# Patient Record
Sex: MALE | Race: WHITE | NOT HISPANIC OR LATINO | Employment: OTHER | URBAN - METROPOLITAN AREA
[De-identification: names, ages, dates, MRNs, and addresses within clinical notes are randomized per-mention and may not be internally consistent; named-entity substitution may affect disease eponyms.]

---

## 2019-04-03 ENCOUNTER — TELEPHONE (OUTPATIENT)
Dept: CARDIOLOGY | Facility: CLINIC | Age: 54
End: 2019-04-03

## 2019-04-03 NOTE — TELEPHONE ENCOUNTER
NPV needed  Pts insurance will be [employer], does not go active til 4/15.  Requesting [facility] loc.  First avail 5/16, pt requested sooner   P: [phone]

## 2019-04-25 ENCOUNTER — OFFICE VISIT (OUTPATIENT)
Dept: CARDIOLOGY | Facility: CLINIC | Age: 54
End: 2019-04-25
Payer: COMMERCIAL

## 2019-04-25 ENCOUNTER — TELEPHONE (OUTPATIENT)
Dept: CARDIOLOGY | Facility: CLINIC | Age: 54
End: 2019-04-25

## 2019-04-25 VITALS
SYSTOLIC BLOOD PRESSURE: 120 MMHG | WEIGHT: 315 LBS | RESPIRATION RATE: 18 BRPM | BODY MASS INDEX: 45.1 KG/M2 | HEIGHT: 70 IN | DIASTOLIC BLOOD PRESSURE: 80 MMHG | TEMPERATURE: 98.6 F | HEART RATE: 86 BPM

## 2019-04-25 DIAGNOSIS — R06.09 DOE (DYSPNEA ON EXERTION): Primary | ICD-10-CM

## 2019-04-25 PROBLEM — J44.9 CHRONIC OBSTRUCTIVE PULMONARY DISEASE (CMS/HCC): Status: ACTIVE | Noted: 2019-04-25

## 2019-04-25 PROCEDURE — 99204 OFFICE O/P NEW MOD 45 MIN: CPT | Performed by: INTERNAL MEDICINE

## 2019-04-25 PROCEDURE — 93000 ELECTROCARDIOGRAM COMPLETE: CPT | Performed by: INTERNAL MEDICINE

## 2019-04-25 RX ORDER — VARENICLINE TARTRATE 0.5 (11)-1
KIT ORAL SEE ADMIN INSTRUCTIONS
Refills: 0 | COMMUNITY
Start: 2019-04-15 | End: 2019-08-15 | Stop reason: ALTCHOICE

## 2019-04-25 RX ORDER — HYDROCHLOROTHIAZIDE 25 MG/1
25 TABLET ORAL
Refills: 3 | COMMUNITY
Start: 2019-04-15 | End: 2019-09-18 | Stop reason: SDUPTHER

## 2019-04-25 RX ORDER — ALBUTEROL SULFATE 90 UG/1
INHALANT RESPIRATORY (INHALATION)
Refills: 0 | COMMUNITY
Start: 2019-03-15

## 2019-04-25 ASSESSMENT — ENCOUNTER SYMPTOMS
DYSPNEA ON EXERTION: 1
WEIGHT LOSS: 1
ENDOCRINE NEGATIVE: 1
NEUROLOGICAL NEGATIVE: 1
HEARTBURN: 1
ALLERGIC/IMMUNOLOGIC NEGATIVE: 1
SHORTNESS OF BREATH: 1
BLURRED VISION: 1
PSYCHIATRIC NEGATIVE: 1

## 2019-04-25 NOTE — PROGRESS NOTES
Chief Complaint: I saw Rinku is a new patient today.  He is here to be evaluated for progressive shortness of breath with exertion.  He does have a history of COPD and a long-term cigarette smoking history but he has now only developed shortness of breath but severe lower extremity edema that seems to be responsive to diuretic therapy.  He denies chest discomfort but has shortness of breath with exertion, not anxiety, cold weather, postprandially, certainly after sex he has severe shortness of breath not at rest or nocturnally.  He cannot climb a flight of stairs without getting short of breath, he denies proximal nocturnal dyspnea, orthopnea, palpitations, syncope has lower extremity edema and nocturia.       Medications:  Current Outpatient Prescriptions   Medication Sig Dispense Refill   • albuterol HFA (VENTOLIN HFA) 90 mcg/actuation inhaler inhale ONE TO TWO PUFFS EVERY 6 HOURS AS NEEDED FOR WHEEZING  0   • CHANTIX STARTING MONTH BOX 0.5 mg (11)- 1 mg (42) tablet See admin instr.  0   • hydrochlorothiazide (HYDRODIURIL) 25 mg tablet Take 25 mg by mouth once daily.  3     No current facility-administered medications for this visit.      Past medical history is consistent with COPD and GERD.  Past surgical history: T&A and hernia repair.  Social history: Smoked 2 packs a day for  30 years, alcohol socially drugs are none.  Family history: Mother  stomach gastric carcinoma, father  of cardiovascular disease with a MI and CVA, one sister with stomach cancer..  Dietary history: He eats beef daily does not eat fried foods daily does eat greater than 3 eggs per week does not eat cheese daily but does drink whole milk.  He drinks 1 to 3 cups of coffee a day, no tea, no Coke or Pepsi.  Allergies: None.  Injuries: Fractured hand    Review of Systems:  Review of Systems   Constitution: Positive for weight loss.   HENT: Negative.    Eyes: Positive for blurred vision.   Cardiovascular: Positive for dyspnea  on exertion and leg swelling.   Respiratory: Positive for shortness of breath.    Endocrine: Negative.    Skin: Negative.    Musculoskeletal: Positive for joint pain.   Gastrointestinal: Positive for heartburn.   Genitourinary: Positive for nocturia.   Neurological: Negative.    Psychiatric/Behavioral: Negative.    Allergic/Immunologic: Negative.        Physical Exam:  Vitals:    04/25/19 1400   BP: 120/80   Pulse: 86   Resp: 18   Temp: 37 °C (98.6 °F)     Physical Exam   Constitutional: He is oriented to person, place, and time. He appears well-developed and well-nourished.   obesity   HENT:   Head: Normocephalic and atraumatic.   Eyes: Pupils are equal, round, and reactive to light. Conjunctivae and EOM are normal.   Neck: Normal range of motion. Neck supple.   Cardiovascular: Normal rate, regular rhythm, normal heart sounds and intact distal pulses.    Sounds are distant   Pulmonary/Chest: Effort normal and breath sounds normal.   Abdominal: Soft. Bowel sounds are normal.   Musculoskeletal: Normal range of motion.   Neurological: He is alert and oriented to person, place, and time. He has normal strength and normal reflexes.   Skin: Skin is warm, dry and intact.   Psychiatric: He has a normal mood and affect. His speech is normal and behavior is normal. Judgment and thought content normal. Cognition and memory are normal.     EKG:  SR PRWP V1-4 ST -Tabn    Assessment and Plan:  Impression:  Dyspnea.  Edema.  COPD.  Strong cigarette smoking history.  Obesity.  Recommendation:  We are going to perform a non-walking stress nuclear scan to determine whether his shortness of breath is being contributed to by coronary artery disease.  We are choosing in a walking nuclear stress test because he cannot climb a flight of stairs he cannot walk 2 blocks without getting short of breath.  We will going to do an echocardiogram to look the right side of his heart to see if he has pulmonary hypertension.  We will call him with  results of the studies.  We will bring him back within 1 month's time to go over the results and make further recommendations.  We counseled him with diet and exercise today.  We will see him again after the stress study and echo.  Thank you so much trying to see this nice gentleman in the office today.    Alexandre Fernando, DO

## 2019-04-25 NOTE — LETTER
2019     Larry N Finkelstein, DO  [address] [facility] [address]    Patient: Rinku Maier   YOB: 1965   Date of Visit: 2019       Dear Dr. Finkelstein:    Thank you for referring Rinku Maier to me for evaluation. Below are my notes for this consultation.    If you have questions, please do not hesitate to call me. I look forward to following your patient along with you.         Sincerely,        Alexandre Fernando DO        CC: No Recipients  Alexandre Fernando DO  2019  3:26 PM  Signed      Chief Complaint: I saw Rinku is a new patient today.  He is here to be evaluated for progressive shortness of breath with exertion.  He does have a history of COPD and a long-term cigarette smoking history but he has now only developed shortness of breath but severe lower extremity edema that seems to be responsive to diuretic therapy.  He denies chest discomfort but has shortness of breath with exertion, not anxiety, cold weather, postprandially, certainly after sex he has severe shortness of breath not at rest or nocturnally.  He cannot climb a flight of stairs without getting short of breath, he denies proximal nocturnal dyspnea, orthopnea, palpitations, syncope has lower extremity edema and nocturia.       Medications:  Current Outpatient Prescriptions   Medication Sig Dispense Refill   • albuterol HFA (VENTOLIN HFA) 90 mcg/actuation inhaler inhale ONE TO TWO PUFFS EVERY 6 HOURS AS NEEDED FOR WHEEZING  0   • CHANTIX STARTING MONTH BOX 0.5 mg (11)- 1 mg (42) tablet See admin instr.  0   • hydrochlorothiazide (HYDRODIURIL) 25 mg tablet Take 25 mg by mouth once daily.  3     No current facility-administered medications for this visit.      Past medical history is consistent with COPD and GERD.  Past surgical history: T&A and hernia repair.  Social history: Smoked 2 packs a day for  30 years, alcohol socially drugs are none.  Family history: Mother  stomach gastric  carcinoma, father  of cardiovascular disease with a MI and CVA, one sister with stomach cancer..  Dietary history: He eats beef daily does not eat fried foods daily does eat greater than 3 eggs per week does not eat cheese daily but does drink whole milk.  He drinks 1 to 3 cups of coffee a day, no tea, no Coke or Pepsi.  Allergies: None.  Injuries: Fractured hand    Review of Systems:  Review of Systems   Constitution: Positive for weight loss.   HENT: Negative.    Eyes: Positive for blurred vision.   Cardiovascular: Positive for dyspnea on exertion and leg swelling.   Respiratory: Positive for shortness of breath.    Endocrine: Negative.    Skin: Negative.    Musculoskeletal: Positive for joint pain.   Gastrointestinal: Positive for heartburn.   Genitourinary: Positive for nocturia.   Neurological: Negative.    Psychiatric/Behavioral: Negative.    Allergic/Immunologic: Negative.        Physical Exam:  Vitals:    19 1400   BP: 120/80   Pulse: 86   Resp: 18   Temp: 37 °C (98.6 °F)     Physical Exam   Constitutional: He is oriented to person, place, and time. He appears well-developed and well-nourished.   obesity   HENT:   Head: Normocephalic and atraumatic.   Eyes: Pupils are equal, round, and reactive to light. Conjunctivae and EOM are normal.   Neck: Normal range of motion. Neck supple.   Cardiovascular: Normal rate, regular rhythm, normal heart sounds and intact distal pulses.    Sounds are distant   Pulmonary/Chest: Effort normal and breath sounds normal.   Abdominal: Soft. Bowel sounds are normal.   Musculoskeletal: Normal range of motion.   Neurological: He is alert and oriented to person, place, and time. He has normal strength and normal reflexes.   Skin: Skin is warm, dry and intact.   Psychiatric: He has a normal mood and affect. His speech is normal and behavior is normal. Judgment and thought content normal. Cognition and memory are normal.     EKG:   PRWP V1-4 ST -Tabn    Assessment and  Plan:  Impression:  Dyspnea.  Edema.  COPD.  Strong cigarette smoking history.  Obesity.  Recommendation:  We are going to perform a non-walking stress nuclear scan to determine whether his shortness of breath is being contributed to by coronary artery disease.  We are choosing in a walking nuclear stress test because he cannot climb a flight of stairs he cannot walk 2 blocks without getting short of breath.  We will going to do an echocardiogram to look the right side of his heart to see if he has pulmonary hypertension.  We will call him with results of the studies.  We will bring him back within 1 month's time to go over the results and make further recommendations.  We counseled him with diet and exercise today.  We will see him again after the stress study and echo.  Thank you so much trying to see this nice gentleman in the office today.    Alexandre Fernando, DO

## 2019-04-25 NOTE — TELEPHONE ENCOUNTER
Rinku is scheduled for NST on May 29th and  (2 part)  [facility] [facility]  ID # [id number]  1965  Will need precert!!  Thx

## 2019-05-24 ENCOUNTER — HOSPITAL ENCOUNTER (OUTPATIENT)
Dept: SLEEP MEDICINE | Facility: HOSPITAL | Age: 54
Discharge: HOME | End: 2019-05-24
Attending: INTERNAL MEDICINE
Payer: COMMERCIAL

## 2019-05-24 DIAGNOSIS — G47.33 OSA (OBSTRUCTIVE SLEEP APNEA): ICD-10-CM

## 2019-05-24 PROCEDURE — 95811 POLYSOM 6/>YRS CPAP 4/> PARM: CPT

## 2019-05-29 ENCOUNTER — HOSPITAL ENCOUNTER (OUTPATIENT)
Dept: CARDIOLOGY | Facility: CLINIC | Age: 54
Setting detail: NUCLEAR MEDICINE
Discharge: HOME | End: 2019-05-29
Attending: INTERNAL MEDICINE
Payer: COMMERCIAL

## 2019-05-29 VITALS — SYSTOLIC BLOOD PRESSURE: 130 MMHG | HEART RATE: 106 BPM | DIASTOLIC BLOOD PRESSURE: 84 MMHG

## 2019-05-29 DIAGNOSIS — R06.09 DOE (DYSPNEA ON EXERTION): ICD-10-CM

## 2019-05-29 PROCEDURE — 78452 HT MUSCLE IMAGE SPECT MULT: CPT | Performed by: INTERNAL MEDICINE

## 2019-05-29 PROCEDURE — A9502 TC99M TETROFOSMIN: HCPCS | Performed by: INTERNAL MEDICINE

## 2019-05-29 PROCEDURE — 93015 CV STRESS TEST SUPVJ I&R: CPT | Performed by: INTERNAL MEDICINE

## 2019-05-30 ENCOUNTER — HOSPITAL ENCOUNTER (OUTPATIENT)
Dept: CARDIOLOGY | Facility: CLINIC | Age: 54
Setting detail: NUCLEAR MEDICINE
Discharge: HOME | End: 2019-05-30
Attending: INTERNAL MEDICINE
Payer: COMMERCIAL

## 2019-05-30 RX ORDER — REGADENOSON 0.08 MG/ML
0.4 INJECTION, SOLUTION INTRAVENOUS ONCE
Status: COMPLETED | OUTPATIENT
Start: 2019-05-30 | End: 2019-05-30

## 2019-05-30 RX ADMIN — REGADENOSON 0.4 MG: 0.08 INJECTION, SOLUTION INTRAVENOUS at 09:45

## 2019-05-31 DIAGNOSIS — Z01.818 PRE-OP EVALUATION: ICD-10-CM

## 2019-05-31 DIAGNOSIS — I20.9 ANGINA PECTORIS (CMS/HCC): Primary | ICD-10-CM

## 2019-05-31 LAB
STRESS BASELINE BP: NORMAL MMHG
STRESS BASELINE HR: 84 BPM
STRESS ECHO POST RECOVERY HR: 104 BPM
STRESS PERCENT HR: 68 %
STRESS POST PEAK BP: NORMAL MMHG
STRESS POST PEAK HR: 114 BPM
STRESS TARGET HR: 143 BPM

## 2019-06-03 ENCOUNTER — TELEPHONE (OUTPATIENT)
Dept: SCHEDULING | Facility: CLINIC | Age: 54
End: 2019-06-03

## 2019-06-03 PROBLEM — I20.9 ANGINA PECTORIS (CMS/HCC): Status: ACTIVE | Noted: 2019-06-03

## 2019-06-03 NOTE — TELEPHONE ENCOUNTER
Pt returning call to Dr. Fernando to review results of Nuc Stress Test.     Pt can be reached at [phone]

## 2019-06-03 NOTE — TELEPHONE ENCOUNTER
Pt of Dr. Fernando- Missed call to schedule cath. Called Chastity from scheduling, she will call pt back.

## 2019-06-13 LAB
BASOPHILS # BLD AUTO: 0 X10E3/UL (ref 0–0.2)
BASOPHILS NFR BLD AUTO: 0 %
BUN SERPL-MCNC: 16 MG/DL (ref 6–24)
BUN/CREAT SERPL: 12 (ref 9–20)
CALCIUM SERPL-MCNC: 9.5 MG/DL (ref 8.7–10.2)
CHLORIDE SERPL-SCNC: 99 MMOL/L (ref 96–106)
CO2 SERPL-SCNC: 27 MMOL/L (ref 20–29)
CREAT SERPL-MCNC: 1.29 MG/DL (ref 0.76–1.27)
EOSINOPHIL # BLD AUTO: 0.2 X10E3/UL (ref 0–0.4)
EOSINOPHIL NFR BLD AUTO: 2 %
ERYTHROCYTE [DISTWIDTH] IN BLOOD BY AUTOMATED COUNT: 14.7 % (ref 12.3–15.4)
GLUCOSE SERPL-MCNC: 102 MG/DL (ref 65–99)
HCT VFR BLD AUTO: 47.3 % (ref 37.5–51)
HGB BLD-MCNC: 15.9 G/DL (ref 13–17.7)
IMM GRANULOCYTES # BLD AUTO: 0 X10E3/UL (ref 0–0.1)
IMM GRANULOCYTES NFR BLD AUTO: 0 %
INR PPP: 1 (ref 0.8–1.2)
LAB CORP EGFR IF AFRICN AM: 73 ML/MIN/1.73
LAB CORP EGFR IF NONAFRICN AM: 63 ML/MIN/1.73
LYMPHOCYTES # BLD AUTO: 3 X10E3/UL (ref 0.7–3.1)
LYMPHOCYTES NFR BLD AUTO: 27 %
MCH RBC QN AUTO: 29.7 PG (ref 26.6–33)
MCHC RBC AUTO-ENTMCNC: 33.6 G/DL (ref 31.5–35.7)
MCV RBC AUTO: 88 FL (ref 79–97)
MONOCYTES # BLD AUTO: 1 X10E3/UL (ref 0.1–0.9)
MONOCYTES NFR BLD AUTO: 9 %
NEUTROPHILS # BLD AUTO: 6.6 X10E3/UL (ref 1.4–7)
NEUTROPHILS NFR BLD AUTO: 62 %
PLATELET # BLD AUTO: 261 X10E3/UL (ref 150–450)
POTASSIUM SERPL-SCNC: 4.3 MMOL/L (ref 3.5–5.2)
PROTHROMBIN TIME: 10 SEC (ref 9.1–12)
RBC # BLD AUTO: 5.36 X10E6/UL (ref 4.14–5.8)
SODIUM SERPL-SCNC: 138 MMOL/L (ref 134–144)
WBC # BLD AUTO: 10.8 X10E3/UL (ref 3.4–10.8)

## 2019-06-14 ENCOUNTER — HOSPITAL ENCOUNTER (OUTPATIENT)
Facility: HOSPITAL | Age: 54
Setting detail: HOSPITAL OUTPATIENT SURGERY
Discharge: HOME | End: 2019-06-14
Attending: INTERNAL MEDICINE | Admitting: INTERNAL MEDICINE
Payer: COMMERCIAL

## 2019-06-14 VITALS
WEIGHT: 315 LBS | TEMPERATURE: 97.8 F | DIASTOLIC BLOOD PRESSURE: 88 MMHG | HEIGHT: 70 IN | HEART RATE: 84 BPM | OXYGEN SATURATION: 96 % | SYSTOLIC BLOOD PRESSURE: 151 MMHG | BODY MASS INDEX: 45.1 KG/M2 | RESPIRATION RATE: 24 BRPM

## 2019-06-14 DIAGNOSIS — I20.9 ANGINA PECTORIS (CMS/HCC): ICD-10-CM

## 2019-06-14 LAB
ATRIAL RATE: 84
P AXIS: 36
POCT OXYHGB: 71.2 % (ref 93–98)
POCT TEST: ABNORMAL
PR INTERVAL: 162
QRS DURATION: 100
QT INTERVAL: 372
QTC CALCULATION(BAZETT): 439
R AXIS: 23
T WAVE AXIS: 38
VENTRICULAR RATE: 84

## 2019-06-14 PROCEDURE — 92928 PRQ TCAT PLMT NTRAC ST 1 LES: CPT | Mod: RC | Performed by: INTERNAL MEDICINE

## 2019-06-14 PROCEDURE — 71000011 HC PACU PHASE 1 EA ADDL MIN: Performed by: INTERNAL MEDICINE

## 2019-06-14 PROCEDURE — C1769 GUIDE WIRE: HCPCS | Performed by: INTERNAL MEDICINE

## 2019-06-14 PROCEDURE — 63600105 HC IODINE BASED CONTRAST: Mod: JW | Performed by: INTERNAL MEDICINE

## 2019-06-14 PROCEDURE — 27200000 HC STERILE SUPPLY: Performed by: INTERNAL MEDICINE

## 2019-06-14 PROCEDURE — 63600000 HC DRUGS/DETAIL CODE: Performed by: INTERNAL MEDICINE

## 2019-06-14 PROCEDURE — 027034Z DILATION OF CORONARY ARTERY, ONE ARTERY WITH DRUG-ELUTING INTRALUMINAL DEVICE, PERCUTANEOUS APPROACH: ICD-10-PCS | Performed by: INTERNAL MEDICINE

## 2019-06-14 PROCEDURE — 71000001 HC PACU PHASE 1 INITIAL 30MIN: Performed by: INTERNAL MEDICINE

## 2019-06-14 PROCEDURE — C1894 INTRO/SHEATH, NON-LASER: HCPCS | Performed by: INTERNAL MEDICINE

## 2019-06-14 PROCEDURE — 63700000 HC SELF-ADMINISTRABLE DRUG: Performed by: INTERNAL MEDICINE

## 2019-06-14 PROCEDURE — 25000000 HC PHARMACY GENERAL: Performed by: INTERNAL MEDICINE

## 2019-06-14 PROCEDURE — 99153 MOD SED SAME PHYS/QHP EA: CPT | Performed by: INTERNAL MEDICINE

## 2019-06-14 PROCEDURE — B2111ZZ FLUOROSCOPY OF MULTIPLE CORONARY ARTERIES USING LOW OSMOLAR CONTRAST: ICD-10-PCS | Performed by: INTERNAL MEDICINE

## 2019-06-14 PROCEDURE — 99152 MOD SED SAME PHYS/QHP 5/>YRS: CPT | Performed by: INTERNAL MEDICINE

## 2019-06-14 PROCEDURE — C1725 CATH, TRANSLUMIN NON-LASER: HCPCS | Performed by: INTERNAL MEDICINE

## 2019-06-14 PROCEDURE — 93005 ELECTROCARDIOGRAM TRACING: CPT | Mod: 59 | Performed by: INTERNAL MEDICINE

## 2019-06-14 PROCEDURE — 4A023N6 MEASUREMENT OF CARDIAC SAMPLING AND PRESSURE, RIGHT HEART, PERCUTANEOUS APPROACH: ICD-10-PCS | Performed by: INTERNAL MEDICINE

## 2019-06-14 PROCEDURE — C9600 PERC DRUG-EL COR STENT SING: HCPCS | Mod: RC | Performed by: INTERNAL MEDICINE

## 2019-06-14 PROCEDURE — 93010 ELECTROCARDIOGRAM REPORT: CPT | Mod: XP | Performed by: INTERNAL MEDICINE

## 2019-06-14 PROCEDURE — C1887 CATHETER, GUIDING: HCPCS | Performed by: INTERNAL MEDICINE

## 2019-06-14 PROCEDURE — 93456 R HRT CORONARY ARTERY ANGIO: CPT | Mod: 26,XU | Performed by: INTERNAL MEDICINE

## 2019-06-14 PROCEDURE — C1874 STENT, COATED/COV W/DEL SYS: HCPCS | Performed by: INTERNAL MEDICINE

## 2019-06-14 PROCEDURE — 93456 R HRT CORONARY ARTERY ANGIO: CPT | Mod: XU | Performed by: INTERNAL MEDICINE

## 2019-06-14 DEVICE — STENT RONYX35015UX RESOLUTE ONYX 3.50X15
Type: IMPLANTABLE DEVICE | Site: CORONARY | Status: FUNCTIONAL
Brand: RESOLUTE ONYX™

## 2019-06-14 RX ORDER — HEPARIN SODIUM 1000 [USP'U]/ML
INJECTION, SOLUTION INTRAVENOUS; SUBCUTANEOUS AS NEEDED
Status: DISCONTINUED | OUTPATIENT
Start: 2019-06-14 | End: 2019-06-14 | Stop reason: HOSPADM

## 2019-06-14 RX ORDER — LIDOCAINE HYDROCHLORIDE 10 MG/ML
INJECTION, SOLUTION INFILTRATION; PERINEURAL AS NEEDED
Status: DISCONTINUED | OUTPATIENT
Start: 2019-06-14 | End: 2019-06-14 | Stop reason: HOSPADM

## 2019-06-14 RX ORDER — MIDAZOLAM HYDROCHLORIDE 2 MG/2ML
INJECTION, SOLUTION INTRAMUSCULAR; INTRAVENOUS AS NEEDED
Status: DISCONTINUED | OUTPATIENT
Start: 2019-06-14 | End: 2019-06-14 | Stop reason: HOSPADM

## 2019-06-14 RX ORDER — CLOPIDOGREL BISULFATE 75 MG/1
75 TABLET ORAL DAILY
Qty: 30 TABLET | Refills: 3 | Status: SHIPPED | OUTPATIENT
Start: 2019-06-14 | End: 2019-09-18 | Stop reason: SDUPTHER

## 2019-06-14 RX ORDER — ROSUVASTATIN CALCIUM 20 MG/1
20 TABLET, COATED ORAL DAILY
Qty: 30 TABLET | Refills: 3 | Status: SHIPPED | OUTPATIENT
Start: 2019-06-14 | End: 2019-09-18 | Stop reason: SDUPTHER

## 2019-06-14 RX ORDER — CLOPIDOGREL BISULFATE 75 MG/1
TABLET ORAL AS NEEDED
Status: DISCONTINUED | OUTPATIENT
Start: 2019-06-14 | End: 2019-06-14 | Stop reason: HOSPADM

## 2019-06-14 RX ORDER — NICARDIPINE HCL-0.9% SOD CHLOR 1 MG/10 ML
SYRINGE (ML) INTRAVENOUS AS NEEDED
Status: DISCONTINUED | OUTPATIENT
Start: 2019-06-14 | End: 2019-06-14 | Stop reason: HOSPADM

## 2019-06-14 RX ORDER — ASPIRIN 325 MG
325 TABLET ORAL ONCE
Status: COMPLETED | OUTPATIENT
Start: 2019-06-14 | End: 2019-06-14

## 2019-06-14 RX ORDER — FENTANYL CITRATE 50 UG/ML
INJECTION, SOLUTION INTRAMUSCULAR; INTRAVENOUS AS NEEDED
Status: DISCONTINUED | OUTPATIENT
Start: 2019-06-14 | End: 2019-06-14 | Stop reason: HOSPADM

## 2019-06-14 RX ORDER — IODIXANOL 320 MG/ML
INJECTION, SOLUTION INTRAVASCULAR AS NEEDED
Status: DISCONTINUED | OUTPATIENT
Start: 2019-06-14 | End: 2019-06-14 | Stop reason: HOSPADM

## 2019-06-14 RX ORDER — ASPIRIN 81 MG/1
81 TABLET ORAL DAILY
Qty: 30 TABLET | Refills: 3 | Status: SHIPPED | OUTPATIENT
Start: 2019-06-14 | End: 2019-09-18 | Stop reason: SDUPTHER

## 2019-06-14 RX ADMIN — ASPIRIN 325 MG: 325 TABLET ORAL at 07:16

## 2019-06-14 NOTE — Clinical Note
Stent deployed in the mid right coronary artery. Pressure = 16 shania. Inflation time =  10 seconds.

## 2019-06-14 NOTE — ASSESSMENT & PLAN NOTE
Pt with complaints of sob with activity and LE edema helped with diuretic  Stress test  EKG is negative for pharmacologic induced ischemia.  2.  Abnormal nuclear perfusion scan.  Moderate size anterior defect which is reversible and consistent with ischemia in the distribution of the left anterior descending coronary artery.  3.  Gated images: The right ventricle is dilated.  Global hypokinesis of the left ventricle.  Left ventricular function is mild to moderately reduced and is 44%.  For cath today   Plan pending cath results

## 2019-06-14 NOTE — H&P
Cardiology History and Physical     Admitting Diagnosis   Angina pectoris (CMS/HCC) ([facility]) [I20.9]   HPI    Rinku Maier is a 53 y.o. male with PMH of htn, COPD who presents to [facility] for cardiac catheterization. Pt notes + MUELLER and  LE edema helped with duiretics. NO cp. Stress test  EKG is negative for pharmacologic induced ischemia. Abnormal nuclear perfusion scan.  Moderate size anterior defect which is reversible and consistent with ischemia in the distribution of the left anterior descending coronary artery. 3.Gated images: The right ventricle is dilated.  Global hypokinesis of the left ventricle.  Left ventricular function is mild to moderately reduced and is 44%.  For cath today.      Medical History   Medical History:   Past Medical History:   Diagnosis Date   • Abnormal ECG    • COPD (chronic obstructive pulmonary disease) (CMS/HCC) ([facility])    • MUELLER (dyspnea on exertion)    • Hypertension    • Peripheral edema    • Sleep apnea        Surgical History:   Past Surgical History:   Procedure Laterality Date   • HERNIA REPAIR         Allergies: Patient has no known allergies.    No current facility-administered medications for this encounter.        Social History:   Social History     Social History   • Marital status:      Spouse name: N/A   • Number of children: N/A   • Years of education: N/A     Social History Main Topics   • Smoking status: Former Smoker     Packs/day: 0.25     Quit date: 5/10/2019   • Smokeless tobacco: Never Used      Comment: former 2 packs a day smoker, currently taking chantix   • Alcohol use Yes      Comment: occasionally   • Drug use: Unknown   • Sexual activity: Defer     Other Topics Concern   • None     Social History Narrative   • None       Family History:   Family History   Problem Relation Age of Onset   • Stomach cancer Mother    • Heart attack Father    • Stroke Father    • Stomach cancer Sister          Review of Systems   Constitutional:  "negative  Respiratory: positive for dyspnea on exertion  Cardiovascular: positive for edema lower extremity  Gastrointestinal: negative  Genitourinary:negative  Musculoskeletal:negative  Neurological: negative   Objective    Vital Signs for the last 24 hours   Temp:  [36.6 °C (97.8 °F)] 36.6 °C (97.8 °F)  Heart Rate:  [90] 90  Resp:  [10] 10  BP: (162)/(84) 162/84       Physical Exam   BP (!) 162/84   Pulse 90   Temp 36.6 °C (97.8 °F) (Oral)   Resp (!) 10   Ht 1.778 m (5' 10\")   Wt (!) 151 kg (333 lb)   SpO2 97%   BMI 47.78 kg/m²   General appearance: alert, appears stated age and cooperative  Head: normocephalic, without obvious abnormality, atraumatic  Lungs: clear to auscultation bilaterally  Heart: regular rate and rhythm, S1, S2 normal, no murmur, click, rub or gallop  Abdomen: soft, non-tender; bowel sounds normal; no masses, no organomegaly and large  Extremities: trace LE edema  Pulses: 2+ and symmetric bilateral DP  Neurologic: Grossly normal      Labs   Lab Results   Component Value Date    WBC 10.8 06/12/2019    HGB 15.9 06/12/2019    HCT 47.3 06/12/2019     06/12/2019     06/12/2019    K 4.3 06/12/2019    CL 99 06/12/2019    CREATININE 1.29 (H) 06/12/2019    BUN 16 06/12/2019    CO2 27 06/12/2019    INR 1.0 06/12/2019     Troponin I Results     No lab values to display.            Imaging    Regadenoson stress test: Interpretation Summary     1.  The EKG is negative for pharmacologic induced ischemia.  2.  Abnormal nuclear perfusion scan.  Moderate size anterior defect which is reversible and consistent with ischemia in the distribution of the left anterior descending coronary artery.  3.  Gated images: The right ventricle is dilated.  Global hypokinesis of the left ventricle.  Left ventricular function is mild to moderately reduced and is 44%.                     ECG     SR    Telemetry   SR      Assessment/Plan      Angina pectoris (CMS/HCC) ([facility])  Pt with complaints of sob with " activity and LE edema helped with diuretic  Stress test  EKG is negative for pharmacologic induced ischemia.  2.  Abnormal nuclear perfusion scan.  Moderate size anterior defect which is reversible and consistent with ischemia in the distribution of the left anterior descending coronary artery.  3.  Gated images: The right ventricle is dilated.  Global hypokinesis of the left ventricle.  Left ventricular function is mild to moderately reduced and is 44%.  For cath today   Plan pending cath results    Chronic obstructive pulmonary disease (CMS/HCC) ([facility])  Pt has stopped smoking 4 weeks ago  On ellipta;, albuterol   PT notes he just had a sleep study and is waiting for his CPAP   + MUELLER                    RAINE Tilley  6/14/2019  7:34 AM

## 2019-06-14 NOTE — ASSESSMENT & PLAN NOTE
Pt has stopped smoking 4 weeks ago  On ellipta;, albuterol   PT notes he just had a sleep study and is waiting for his CPAP   + MUELLER

## 2019-06-14 NOTE — PRE-PROCEDURE NOTE
Cardiac Cath Lab Pre-procedure Note    - Patient was seen and examined at bedside.  - The patient's chart and all data was reviewed.  - The procedure, treatment alternatives, risks and benefits were explained with specific risks discussed.  - Patient was consented for cardiac cath procedure and possible PCI.  - Patient's case was found appropriate for dual antiplatelet therapy.    Patient's clinical presentation to the cardiac cath lab: stable ischemic symptoms.       Patient appears to be managing well.     Notable Non-Invasive Cardiac Testing  - Myocardial perfusion imaging: intermediate risk (abnormal perfusion in 5-9.9% of myocardium)       Total anti-anginal medications: 1.         Pre-sedation assessment  ASA 2   Mallampati class: III - soft palate, base of uvula visible.

## 2019-06-14 NOTE — DISCHARGE INSTRUCTIONS
· Post cardiac catheterization instructions:  · NO driving or operating heavy machinery for 24 hours.  This is because of sedation you received for your procedure.  · On day of discharge, limit activities.  · No heavy lifting greater than 10 lbs for the next 2 days after procedure.    · Remove dressing next day. Keep site clean and dry.  · May shower next day of procedure. Do not submerge catherization site in pool or tub baths for 5 days  · Call if drainage, swelling, bleeding, fever or drainage from catheterization site          Medications:  Please take medications as directed. Any questions or concerns, please contact your physician's  Office.    New medication:NEW Plavix 75 mg daily-- This medication is because of the stents in your heart. Do not stop this medication unless directed by cardiologist                             NEW Crestor 20 mg daily      Follow up: Dr. Fernando   Follow up as scheduled June 27th

## 2019-06-14 NOTE — NURSING NOTE
Left radial vasc band removed. Dressing applied without s/s of bleeding or hematoma noted. Patient remains in bed resting.

## 2019-06-14 NOTE — NURSING NOTE
Patient in bed resting eating without difficulty. Left radial access site clean dry and intact. Will continue to monitor.

## 2019-06-14 NOTE — NURSING NOTE
Patient in bed resting. Left radial vasc band intact without s/s of bleeding or hematoma noted. Food ordered. Patient family at bedside.

## 2019-06-14 NOTE — NURSING NOTE
Patient to bathroom and back to bed. Left radial vasc band starting to be removed. No s/s of distress at this time. Will continue to monitor.

## 2019-06-17 LAB
POCT ACT-LR: >400 SEC (ref 113–149)
POCT TEST: ABNORMAL

## 2019-06-27 ENCOUNTER — OFFICE VISIT (OUTPATIENT)
Dept: CARDIOLOGY | Facility: CLINIC | Age: 54
End: 2019-06-27
Payer: COMMERCIAL

## 2019-06-27 VITALS
WEIGHT: 315 LBS | SYSTOLIC BLOOD PRESSURE: 128 MMHG | BODY MASS INDEX: 45.1 KG/M2 | HEART RATE: 92 BPM | RESPIRATION RATE: 16 BRPM | DIASTOLIC BLOOD PRESSURE: 88 MMHG | HEIGHT: 70 IN

## 2019-06-27 DIAGNOSIS — J44.9 CHRONIC OBSTRUCTIVE PULMONARY DISEASE, UNSPECIFIED COPD TYPE (CMS/HCC): Primary | ICD-10-CM

## 2019-06-27 PROCEDURE — 93000 ELECTROCARDIOGRAM COMPLETE: CPT | Performed by: INTERNAL MEDICINE

## 2019-06-27 PROCEDURE — 99214 OFFICE O/P EST MOD 30 MIN: CPT | Performed by: INTERNAL MEDICINE

## 2019-06-27 ASSESSMENT — ENCOUNTER SYMPTOMS
WEIGHT LOSS: 1
GASTROINTESTINAL NEGATIVE: 1
NEUROLOGICAL NEGATIVE: 1
PSYCHIATRIC NEGATIVE: 1
ALLERGIC/IMMUNOLOGIC NEGATIVE: 1
DYSPNEA ON EXERTION: 1
HEMATOLOGIC/LYMPHATIC NEGATIVE: 1
SHORTNESS OF BREATH: 1
ENDOCRINE NEGATIVE: 1
EYES NEGATIVE: 1

## 2019-06-27 NOTE — PROGRESS NOTES
Chief Complaint: We saw Rinku in follow-up today after his cardiac catheterization which demonstrated a severe lesion in the right coronary artery that was stented.  He is breathing easier but still has some shortness of breath based on his underlying lung disease and smoking history with exertion.  He denies chest discomfort or shortness of breath with anxiety but, does get short of breath in the cold weather, not postprandially with sex at rest or nocturnally.  States he can climb a flight of stairs but still gets short of breath but not a short of breath as he was.  He denies proximal nocturnal dyspnea, orthopnea, palpitations, syncope he denies lower extremity edema and he has nocturia.     Medications:  Current Outpatient Prescriptions   Medication Sig Dispense Refill   • albuterol HFA (VENTOLIN HFA) 90 mcg/actuation inhaler inhale ONE TO TWO PUFFS EVERY 6 HOURS AS NEEDED FOR WHEEZING  0   • aspirin 81 mg enteric coated tablet Take 1 tablet (81 mg total) by mouth daily. 30 tablet 3   • CHANTIX STARTING MONTH BOX 0.5 mg (11)- 1 mg (42) tablet See admin instr.  0   • clopidogrel (PLAVIX) 75 mg tablet Take 1 tablet (75 mg total) by mouth daily. 30 tablet 3   • hydrochlorothiazide (HYDRODIURIL) 25 mg tablet Take 25 mg by mouth once daily.  3   • rosuvastatin (CRESTOR) 20 mg tablet Take 1 tablet (20 mg total) by mouth daily. 30 tablet 3   • umeclidinium-vilanterol (ANORO ELLIPTA) 62.5-25 mcg/actuation blister with device Inhale 1 puff daily.       No current facility-administered medications for this visit.        Review of Systems:  Review of Systems   Constitution: Positive for weight loss.   HENT: Negative.    Eyes: Negative.    Cardiovascular: Positive for dyspnea on exertion.   Respiratory: Positive for shortness of breath.    Endocrine: Negative.    Hematologic/Lymphatic: Negative.    Skin: Positive for rash.   Musculoskeletal: Positive for joint pain.   Gastrointestinal: Negative.    Genitourinary:  Positive for nocturia.   Neurological: Negative.    Psychiatric/Behavioral: Negative.    Allergic/Immunologic: Negative.        Physical Exam:  Vitals:    06/27/19 1425   BP: 128/88   Pulse: 92   Resp: 16     Physical Exam   Constitutional: He is oriented to person, place, and time. He appears well-developed and well-nourished.   obese   HENT:   Head: Normocephalic and atraumatic.   Eyes: Pupils are equal, round, and reactive to light. Conjunctivae and EOM are normal.   Neck: Normal range of motion. Neck supple.   Cardiovascular: Normal rate, regular rhythm and intact distal pulses.  Exam reveals gallop (S4).    Pulmonary/Chest: Effort normal and breath sounds normal.   Abdominal: Soft. Bowel sounds are normal.   Musculoskeletal: Normal range of motion.   Neurological: He is alert and oriented to person, place, and time. He has normal strength and normal reflexes.   Skin: Skin is warm, dry and intact.   Psychiatric: He has a normal mood and affect. His speech is normal and behavior is normal. Judgment and thought content normal. Cognition and memory are normal.     EKG: SR PRWP V1-4 ST-Tabn    Assessment and Plan:  Impression:  Coronary artery disease status post stent to the right coronary artery.  COPD causing his shortness of breath.  Obesity.  Recommendation:  I am anxious to see what kind of lung disease or the etiology of his lung disease he has.  He is going to see Chuck Shah shortly.  We have encouraged him and counseled him on diet and exercise he will be able to do it with his girlfriend who is also a patient of ours.  We will see him in 6 weeks time to see how his progress is.  If there is a problem we will see him sooner.  Thank you for the opportunity seeing this nice gentleman in the office today.    Alexandre Fernando, DO

## 2019-06-27 NOTE — LETTER
June 27, 2019     Larry N Finkelstein, DO  [address] [facility] [address]    Patient: Rinku Maier  YOB: 1965  Date of Visit: 6/27/2019      Dear Dr. Finkelstein:    Thank you for referring Rinku Maier to me for evaluation. Below are my notes for this consultation.    If you have questions, please do not hesitate to call me. I look forward to following your patient along with you.         Sincerely,        Alexandre Fernando DO        CC: No Recipients  Alexandre Fernando DO  6/27/2019  3:00 PM  Signed      Chief Complaint: We saw Rinku in follow-up today after his cardiac catheterization which demonstrated a severe lesion in the right coronary artery that was stented.  He is breathing easier but still has some shortness of breath based on his underlying lung disease and smoking history with exertion.  He denies chest discomfort or shortness of breath with anxiety but, does get short of breath in the cold weather, not postprandially with sex at rest or nocturnally.  States he can climb a flight of stairs but still gets short of breath but not a short of breath as he was.  He denies proximal nocturnal dyspnea, orthopnea, palpitations, syncope he denies lower extremity edema and he has nocturia.     Medications:  Current Outpatient Prescriptions   Medication Sig Dispense Refill   • albuterol HFA (VENTOLIN HFA) 90 mcg/actuation inhaler inhale ONE TO TWO PUFFS EVERY 6 HOURS AS NEEDED FOR WHEEZING  0   • aspirin 81 mg enteric coated tablet Take 1 tablet (81 mg total) by mouth daily. 30 tablet 3   • CHANTIX STARTING MONTH BOX 0.5 mg (11)- 1 mg (42) tablet See admin instr.  0   • clopidogrel (PLAVIX) 75 mg tablet Take 1 tablet (75 mg total) by mouth daily. 30 tablet 3   • hydrochlorothiazide (HYDRODIURIL) 25 mg tablet Take 25 mg by mouth once daily.  3   • rosuvastatin (CRESTOR) 20 mg tablet Take 1 tablet (20 mg total) by mouth daily. 30 tablet 3   • umeclidinium-vilanterol (ANORO  ELLIPTA) 62.5-25 mcg/actuation blister with device Inhale 1 puff daily.       No current facility-administered medications for this visit.        Review of Systems:  Review of Systems   Constitution: Positive for weight loss.   HENT: Negative.    Eyes: Negative.    Cardiovascular: Positive for dyspnea on exertion.   Respiratory: Positive for shortness of breath.    Endocrine: Negative.    Hematologic/Lymphatic: Negative.    Skin: Positive for rash.   Musculoskeletal: Positive for joint pain.   Gastrointestinal: Negative.    Genitourinary: Positive for nocturia.   Neurological: Negative.    Psychiatric/Behavioral: Negative.    Allergic/Immunologic: Negative.        Physical Exam:  Vitals:    06/27/19 1425   BP: 128/88   Pulse: 92   Resp: 16     Physical Exam   Constitutional: He is oriented to person, place, and time. He appears well-developed and well-nourished.   obese   HENT:   Head: Normocephalic and atraumatic.   Eyes: Pupils are equal, round, and reactive to light. Conjunctivae and EOM are normal.   Neck: Normal range of motion. Neck supple.   Cardiovascular: Normal rate, regular rhythm and intact distal pulses.  Exam reveals gallop (S4).    Pulmonary/Chest: Effort normal and breath sounds normal.   Abdominal: Soft. Bowel sounds are normal.   Musculoskeletal: Normal range of motion.   Neurological: He is alert and oriented to person, place, and time. He has normal strength and normal reflexes.   Skin: Skin is warm, dry and intact.   Psychiatric: He has a normal mood and affect. His speech is normal and behavior is normal. Judgment and thought content normal. Cognition and memory are normal.     EKG: SR PRWP V1-4 ST-Tabn    Assessment and Plan:  Impression:  Coronary artery disease status post stent to the right coronary artery.  COPD causing his shortness of breath.  Obesity.  Recommendation:  I am anxious to see what kind of lung disease or the etiology of his lung disease he has.  He is going to see Chuck  Alyssa shortly.  We have encouraged him and counseled him on diet and exercise he will be able to do it with his girlfriend who is also a patient of ours.  We will see him in 6 weeks time to see how his progress is.  If there is a problem we will see him sooner.  Thank you for the opportunity seeing this nice gentleman in the office today.    Alexandre Fernando, DO

## 2019-08-15 ENCOUNTER — OFFICE VISIT (OUTPATIENT)
Dept: CARDIOLOGY | Facility: CLINIC | Age: 54
End: 2019-08-15
Payer: COMMERCIAL

## 2019-08-15 VITALS
HEART RATE: 88 BPM | HEIGHT: 70 IN | BODY MASS INDEX: 45.1 KG/M2 | DIASTOLIC BLOOD PRESSURE: 88 MMHG | WEIGHT: 315 LBS | RESPIRATION RATE: 16 BRPM | SYSTOLIC BLOOD PRESSURE: 136 MMHG

## 2019-08-15 DIAGNOSIS — J44.9 CHRONIC OBSTRUCTIVE PULMONARY DISEASE, UNSPECIFIED COPD TYPE (CMS/HCC): Primary | ICD-10-CM

## 2019-08-15 PROCEDURE — 93000 ELECTROCARDIOGRAM COMPLETE: CPT | Performed by: INTERNAL MEDICINE

## 2019-08-15 PROCEDURE — 99214 OFFICE O/P EST MOD 30 MIN: CPT | Performed by: INTERNAL MEDICINE

## 2019-08-15 ASSESSMENT — ENCOUNTER SYMPTOMS
RESPIRATORY NEGATIVE: 1
ALLERGIC/IMMUNOLOGIC NEGATIVE: 1
EYES NEGATIVE: 1
MUSCULOSKELETAL NEGATIVE: 1
PSYCHIATRIC NEGATIVE: 1
NEUROLOGICAL NEGATIVE: 1
ENDOCRINE NEGATIVE: 1
CARDIOVASCULAR NEGATIVE: 1
WEIGHT LOSS: 1
HEMATOLOGIC/LYMPHATIC NEGATIVE: 1
GASTROINTESTINAL NEGATIVE: 1

## 2019-08-15 NOTE — PROGRESS NOTES
Chief Complaint: I saw Rinku in the office today on a routine visit status post stent to the right coronary artery.  He is doing well has some atypical chest pain when he sneezes and on his chest wall he is  to palpation there now.  He denies chest discomfort or shortness of breath with standard exertion, not anxiety, cold weather, postprandially, with sex, at rest, or nocturnally.  He can climb a flight of stairs without getting short of breath, he denies proximal nocturnal dyspnea, orthopnea, palpitations, syncope, ankle edema he has nocturia.       Medications:  Current Outpatient Prescriptions   Medication Sig Dispense Refill   • albuterol HFA (VENTOLIN HFA) 90 mcg/actuation inhaler inhale ONE TO TWO PUFFS EVERY 6 HOURS AS NEEDED FOR WHEEZING  0   • aspirin 81 mg enteric coated tablet Take 1 tablet (81 mg total) by mouth daily. 30 tablet 3   • clopidogrel (PLAVIX) 75 mg tablet Take 1 tablet (75 mg total) by mouth daily. 30 tablet 3   • hydrochlorothiazide (HYDRODIURIL) 25 mg tablet Take 25 mg by mouth once daily.  3   • rosuvastatin (CRESTOR) 20 mg tablet Take 1 tablet (20 mg total) by mouth daily. 30 tablet 3   • umeclidinium-vilanterol (ANORO ELLIPTA) 62.5-25 mcg/actuation blister with device Inhale 1 puff daily.       No current facility-administered medications for this visit.        Review of Systems:  Review of Systems   Constitution: Positive for weight loss.   HENT: Negative.    Eyes: Negative.    Cardiovascular: Negative.    Respiratory: Negative.    Endocrine: Negative.    Hematologic/Lymphatic: Negative.    Skin: Negative.    Musculoskeletal: Negative.    Gastrointestinal: Negative.    Genitourinary: Positive for nocturia.   Neurological: Negative.    Psychiatric/Behavioral: Negative.    Allergic/Immunologic: Negative.        Physical Exam:  Vitals:    08/15/19 1355   BP: 136/88   Pulse: 88   Resp: 16     Physical Exam   Constitutional: He is oriented to person, place, and time. He  appears well-developed and well-nourished.   obese   HENT:   Head: Normocephalic and atraumatic.   Eyes: Pupils are equal, round, and reactive to light. Conjunctivae and EOM are normal.   Neck: Normal range of motion. Neck supple.   Cardiovascular: Normal rate, regular rhythm, normal heart sounds and intact distal pulses.    Sounds are distant   Pulmonary/Chest: Effort normal and breath sounds normal.   Abdominal: Soft. Bowel sounds are normal.   Musculoskeletal: Normal range of motion.   Neurological: He is alert and oriented to person, place, and time. He has normal strength and normal reflexes.   Skin: Skin is warm, dry and intact.   Psychiatric: He has a normal mood and affect. His speech is normal and behavior is normal. Judgment and thought content normal. Cognition and memory are normal.     EKG:  SR IVCD, No change    Assessment and Plan:  Impression:  Coronary artery disease status post stent to the RCA.  COPD.  Sleep apnea.  Exogenous obesity.  Hypertension.  Recommendation:  He is hemodynamically stable and has been successful losing 13 pounds while still stopping smoking.  He is exercising on a daily basis and is decreasing his food intake.  However to be very proud of himself he is doing a great job we will see him in 3 months time, if there is a problem we will see him sooner.  Thank you so much fine see this gentleman today.    Alexandre Fernando, DO

## 2019-08-15 NOTE — LETTER
August 15, 2019     Larry N Finkelstein, DO  [address] [facility] [address]    Patient: Rinku Maier  YOB: 1965  Date of Visit: 8/15/2019      Dear Dr. Finkelstein:    Thank you for referring Rinku Maier to me for evaluation. Below are my notes for this consultation.    If you have questions, please do not hesitate to call me. I look forward to following your patient along with you.         Sincerely,        Alexandre Fernando DO        CC: No Recipients  Alexandre Fernando DO  8/15/2019  2:37 PM  Signed      Chief Complaint: I saw Rinku in the office today on a routine visit status post stent to the right coronary artery.  He is doing well has some atypical chest pain when he sneezes and on his chest wall he is  to palpation there now.  He denies chest discomfort or shortness of breath with standard exertion, not anxiety, cold weather, postprandially, with sex, at rest, or nocturnally.  He can climb a flight of stairs without getting short of breath, he denies proximal nocturnal dyspnea, orthopnea, palpitations, syncope, ankle edema he has nocturia.       Medications:  Current Outpatient Prescriptions   Medication Sig Dispense Refill   • albuterol HFA (VENTOLIN HFA) 90 mcg/actuation inhaler inhale ONE TO TWO PUFFS EVERY 6 HOURS AS NEEDED FOR WHEEZING  0   • aspirin 81 mg enteric coated tablet Take 1 tablet (81 mg total) by mouth daily. 30 tablet 3   • clopidogrel (PLAVIX) 75 mg tablet Take 1 tablet (75 mg total) by mouth daily. 30 tablet 3   • hydrochlorothiazide (HYDRODIURIL) 25 mg tablet Take 25 mg by mouth once daily.  3   • rosuvastatin (CRESTOR) 20 mg tablet Take 1 tablet (20 mg total) by mouth daily. 30 tablet 3   • umeclidinium-vilanterol (ANORO ELLIPTA) 62.5-25 mcg/actuation blister with device Inhale 1 puff daily.       No current facility-administered medications for this visit.        Review of Systems:  Review of Systems   Constitution: Positive for weight  loss.   HENT: Negative.    Eyes: Negative.    Cardiovascular: Negative.    Respiratory: Negative.    Endocrine: Negative.    Hematologic/Lymphatic: Negative.    Skin: Negative.    Musculoskeletal: Negative.    Gastrointestinal: Negative.    Genitourinary: Positive for nocturia.   Neurological: Negative.    Psychiatric/Behavioral: Negative.    Allergic/Immunologic: Negative.        Physical Exam:  Vitals:    08/15/19 1355   BP: 136/88   Pulse: 88   Resp: 16     Physical Exam   Constitutional: He is oriented to person, place, and time. He appears well-developed and well-nourished.   obese   HENT:   Head: Normocephalic and atraumatic.   Eyes: Pupils are equal, round, and reactive to light. Conjunctivae and EOM are normal.   Neck: Normal range of motion. Neck supple.   Cardiovascular: Normal rate, regular rhythm, normal heart sounds and intact distal pulses.    Sounds are distant   Pulmonary/Chest: Effort normal and breath sounds normal.   Abdominal: Soft. Bowel sounds are normal.   Musculoskeletal: Normal range of motion.   Neurological: He is alert and oriented to person, place, and time. He has normal strength and normal reflexes.   Skin: Skin is warm, dry and intact.   Psychiatric: He has a normal mood and affect. His speech is normal and behavior is normal. Judgment and thought content normal. Cognition and memory are normal.     EKG:  SR IVCD, No change    Assessment and Plan:  Impression:  Coronary artery disease status post stent to the RCA.  COPD.  Sleep apnea.  Exogenous obesity.  Hypertension.  Recommendation:  He is hemodynamically stable and has been successful losing 13 pounds while still stopping smoking.  He is exercising on a daily basis and is decreasing his food intake.  However to be very proud of himself he is doing a great job we will see him in 3 months time, if there is a problem we will see him sooner.  Thank you so much fine see this gentleman today.    Alexandre Fernando, DO

## 2019-09-18 NOTE — TELEPHONE ENCOUNTER
Medicine Refill Request    Last Office Visit: Visit date not found  Next Office Visit: Visit date not found    #90 refills    Current Outpatient Prescriptions:   •  albuterol HFA (VENTOLIN HFA) 90 mcg/actuation inhaler, inhale ONE TO TWO PUFFS EVERY 6 HOURS AS NEEDED FOR WHEEZING, Disp: , Rfl: 0  •  aspirin 81 mg enteric coated tablet, Take 1 tablet (81 mg total) by mouth daily., Disp: 30 tablet, Rfl: 3  •  clopidogrel (PLAVIX) 75 mg tablet, Take 1 tablet (75 mg total) by mouth daily., Disp: 30 tablet, Rfl: 3  •  hydrochlorothiazide (HYDRODIURIL) 25 mg tablet, Take 25 mg by mouth once daily., Disp: , Rfl: 3  •  rosuvastatin (CRESTOR) 20 mg tablet, Take 1 tablet (20 mg total) by mouth daily., Disp: 30 tablet, Rfl: 3  •  umeclidinium-vilanterol (ANORO ELLIPTA) 62.5-25 mcg/actuation blister with device, Inhale 1 puff daily., Disp: , Rfl:       BP Readings from Last 3 Encounters:   08/15/19 136/88   06/27/19 128/88   06/14/19 (!) 151/88       Recent Lab results:  No results found for: CHOL, No results found for: HDL, No results found for: LDLCALC, No results found for: TRIG     Lab Results   Component Value Date    GLUCOSE 102 (H) 06/12/2019   , No results found for: HGBA1C      Lab Results   Component Value Date    CREATININE 1.29 (H) 06/12/2019       No results found for: TSH

## 2019-09-20 RX ORDER — HYDROCHLOROTHIAZIDE 25 MG/1
25 TABLET ORAL
Qty: 90 TABLET | Refills: 1 | Status: SHIPPED | OUTPATIENT
Start: 2019-09-20 | End: 2019-11-21 | Stop reason: SDUPTHER

## 2019-09-20 RX ORDER — CLOPIDOGREL BISULFATE 75 MG/1
75 TABLET ORAL DAILY
Qty: 90 TABLET | Refills: 1 | Status: SHIPPED | OUTPATIENT
Start: 2019-09-20 | End: 2019-11-21 | Stop reason: SDUPTHER

## 2019-09-20 RX ORDER — ASPIRIN 81 MG/1
81 TABLET ORAL DAILY
Qty: 90 TABLET | Refills: 1 | Status: SHIPPED | OUTPATIENT
Start: 2019-09-20 | End: 2019-11-21 | Stop reason: SDUPTHER

## 2019-09-20 RX ORDER — ROSUVASTATIN CALCIUM 20 MG/1
20 TABLET, COATED ORAL DAILY
Qty: 90 TABLET | Refills: 1 | Status: SHIPPED | OUTPATIENT
Start: 2019-09-20 | End: 2019-11-21 | Stop reason: SDUPTHER

## 2019-11-21 ENCOUNTER — OFFICE VISIT (OUTPATIENT)
Dept: CARDIOLOGY | Facility: CLINIC | Age: 54
End: 2019-11-21
Payer: COMMERCIAL

## 2019-11-21 VITALS
HEIGHT: 70 IN | DIASTOLIC BLOOD PRESSURE: 84 MMHG | WEIGHT: 315 LBS | HEART RATE: 87 BPM | SYSTOLIC BLOOD PRESSURE: 150 MMHG | BODY MASS INDEX: 45.1 KG/M2 | RESPIRATION RATE: 16 BRPM

## 2019-11-21 DIAGNOSIS — I10 ESSENTIAL HYPERTENSION: Primary | ICD-10-CM

## 2019-11-21 PROBLEM — E66.01 CLASS 3 SEVERE OBESITY DUE TO EXCESS CALORIES WITH SERIOUS COMORBIDITY IN ADULT (CMS/HCC): Status: ACTIVE | Noted: 2019-11-21

## 2019-11-21 PROBLEM — Z95.5 STATUS POST INSERTION OF DRUG ELUTING CORONARY ARTERY STENT: Status: ACTIVE | Noted: 2019-11-21

## 2019-11-21 PROBLEM — I25.10 CORONARY ARTERY DISEASE INVOLVING NATIVE CORONARY ARTERY OF NATIVE HEART WITHOUT ANGINA PECTORIS: Status: ACTIVE | Noted: 2019-11-21

## 2019-11-21 PROBLEM — E66.813 CLASS 3 SEVERE OBESITY DUE TO EXCESS CALORIES WITH SERIOUS COMORBIDITY IN ADULT (CMS/HCC): Status: ACTIVE | Noted: 2019-11-21

## 2019-11-21 PROCEDURE — 93000 ELECTROCARDIOGRAM COMPLETE: CPT | Performed by: INTERNAL MEDICINE

## 2019-11-21 PROCEDURE — 99214 OFFICE O/P EST MOD 30 MIN: CPT | Performed by: INTERNAL MEDICINE

## 2019-11-21 RX ORDER — HYDROCHLOROTHIAZIDE 25 MG/1
25 TABLET ORAL
Qty: 90 TABLET | Refills: 3 | Status: SHIPPED | OUTPATIENT
Start: 2019-11-21 | End: 2021-02-10 | Stop reason: SDUPTHER

## 2019-11-21 RX ORDER — ASPIRIN 81 MG/1
81 TABLET ORAL DAILY
Qty: 90 TABLET | Refills: 3 | Status: SHIPPED | OUTPATIENT
Start: 2019-11-21 | End: 2020-04-06 | Stop reason: SDUPTHER

## 2019-11-21 RX ORDER — ROSUVASTATIN CALCIUM 20 MG/1
20 TABLET, COATED ORAL DAILY
Qty: 90 TABLET | Refills: 3 | Status: SHIPPED | OUTPATIENT
Start: 2019-11-21 | End: 2021-02-10 | Stop reason: SDUPTHER

## 2019-11-21 RX ORDER — CLOPIDOGREL BISULFATE 75 MG/1
75 TABLET ORAL DAILY
Qty: 90 TABLET | Refills: 3 | Status: SHIPPED | OUTPATIENT
Start: 2019-11-21 | End: 2020-04-06 | Stop reason: SDUPTHER

## 2019-11-21 ASSESSMENT — ENCOUNTER SYMPTOMS
ENDOCRINE NEGATIVE: 1
DYSPNEA ON EXERTION: 1
GASTROINTESTINAL NEGATIVE: 1
NEUROLOGICAL NEGATIVE: 1
RESPIRATORY NEGATIVE: 1
BACK PAIN: 1
NIGHT SWEATS: 1
WEIGHT LOSS: 1
ALLERGIC/IMMUNOLOGIC NEGATIVE: 1
HEMATOLOGIC/LYMPHATIC NEGATIVE: 1
EYES NEGATIVE: 1
PSYCHIATRIC NEGATIVE: 1

## 2019-11-21 NOTE — PROGRESS NOTES
Might get    Chief Complaint: I saw Rinku in the office today on a routine visit for his coronary artery disease with a stent in the right coronary artery.  He is hemodynamically stable and has lost another 7 pounds.  That is a total of 27 pounds since he seen me and since he had his stent placed.  He also is not smoking cigarettes.  He denies any form of chest discomfort with exertion anxiety cold weather postprandially with sex at rest or nocturnally.  He can climb a flight of stairs without getting short of breath, he denies proximal nocturnal dyspnea he has 5 pillow orthopnea he denies palpitations syncope ankle edema he does have nocturia.  The only time he gets short of breath now is walking in the cold weather into the wind.       Medications:  Current Outpatient Medications   Medication Sig Dispense Refill   • albuterol HFA (VENTOLIN HFA) 90 mcg/actuation inhaler inhale ONE TO TWO PUFFS EVERY 6 HOURS AS NEEDED FOR WHEEZING  0   • aspirin 81 mg enteric coated tablet Take 1 tablet (81 mg total) by mouth daily. 90 tablet 3   • clopidogrel (PLAVIX) 75 mg tablet Take 1 tablet (75 mg total) by mouth daily. 90 tablet 3   • hydrochlorothiazide (HYDRODIURIL) 25 mg tablet Take 1 tablet (25 mg total) by mouth once daily. 90 tablet 3   • rosuvastatin (CRESTOR) 20 mg tablet Take 1 tablet (20 mg total) by mouth daily. 90 tablet 3   • umeclidinium-vilanterol (ANORO ELLIPTA) 62.5-25 mcg/actuation blister with device Inhale 1 puff daily.       No current facility-administered medications for this visit.        Review of Systems:  Review of Systems   Constitution: Positive for night sweats and weight loss.   HENT: Negative.    Eyes: Negative.    Cardiovascular: Positive for dyspnea on exertion.   Respiratory: Negative.    Endocrine: Negative.    Hematologic/Lymphatic: Negative.    Skin: Negative.    Musculoskeletal: Positive for back pain.   Gastrointestinal: Negative.    Genitourinary: Positive for nocturia.   Neurological:  Negative.    Psychiatric/Behavioral: Negative.    Allergic/Immunologic: Negative.        Physical Exam:  Vitals:    11/21/19 1343   BP: (!) 150/84   Pulse: 87   Resp: 16     Physical Exam   Constitutional: He is oriented to person, place, and time. He appears well-developed and well-nourished.   Morbid obesity   HENT:   Head: Normocephalic and atraumatic.   Eyes: Pupils are equal, round, and reactive to light. Conjunctivae and EOM are normal.   Neck: Normal range of motion. Neck supple.   Cardiovascular: Normal rate and regular rhythm.   Pulmonary/Chest: Effort normal and breath sounds normal.   Abdominal: Soft. Bowel sounds are normal.   Musculoskeletal: Normal range of motion.   Neurological: He is alert and oriented to person, place, and time. He has normal reflexes.   Skin: Skin is warm and dry.   Psychiatric: He has a normal mood and affect. His behavior is normal. Judgment and thought content normal.     EKG: SR leftward axis PRWP V1-4 ST-Tabn    Assessment and Plan:  Impression:  Coronary artery disease status post stent to the RCA.  Hypertension.  Hyperlipidemia.  Emphysema.  Obesity.  Recommendation:  He is hemodynamically stable I did not have to make any changes in his medications.  We talked counseled him on diet and exercise.  We have encouraged him to continue to lose weight he is not smoking and is done a very great job with juggling all these risk factors and reducing them.  We will see him again in 3 months time, if there is a problem we will see him sooner.  Thank you for the opportunity seeing this nice gentleman in the office today.    Alexandre Fernando,

## 2020-04-06 RX ORDER — ASPIRIN 81 MG/1
81 TABLET ORAL DAILY
Qty: 90 TABLET | Refills: 3 | Status: SHIPPED | OUTPATIENT
Start: 2020-04-06 | End: 2021-02-10 | Stop reason: SDUPTHER

## 2020-04-06 RX ORDER — CLOPIDOGREL BISULFATE 75 MG/1
75 TABLET ORAL DAILY
Qty: 90 TABLET | Refills: 3 | Status: SHIPPED | OUTPATIENT
Start: 2020-04-06 | End: 2021-02-10 | Stop reason: SDUPTHER

## 2021-02-10 ENCOUNTER — TELEPHONE (OUTPATIENT)
Dept: CARDIOLOGY | Facility: CLINIC | Age: 56
End: 2021-02-10

## 2021-02-10 RX ORDER — HYDROCHLOROTHIAZIDE 25 MG/1
25 TABLET ORAL
Qty: 90 TABLET | Refills: 3 | Status: SHIPPED | OUTPATIENT
Start: 2021-02-10 | End: 2022-01-20

## 2021-02-10 RX ORDER — ROSUVASTATIN CALCIUM 20 MG/1
20 TABLET, COATED ORAL DAILY
Qty: 90 TABLET | Refills: 3 | Status: SHIPPED | OUTPATIENT
Start: 2021-02-10 | End: 2022-10-13 | Stop reason: SDUPTHER

## 2021-02-10 RX ORDER — ASPIRIN 81 MG/1
81 TABLET ORAL DAILY
Qty: 90 TABLET | Refills: 3 | Status: SHIPPED | OUTPATIENT
Start: 2021-02-10 | End: 2021-05-17

## 2021-02-10 RX ORDER — CLOPIDOGREL BISULFATE 75 MG/1
75 TABLET ORAL DAILY
Qty: 90 TABLET | Refills: 3 | Status: SHIPPED | OUTPATIENT
Start: 2021-02-10 | End: 2021-04-15

## 2021-02-19 NOTE — TELEPHONE ENCOUNTER
Pt calling inquiring about his meds. Requesting to speak with a nurse. Pt can be reached at [phone]

## 2021-02-19 NOTE — TELEPHONE ENCOUNTER
S/w pt. He wants to schedule appt for wed 2/24 in the afternoon.    Advised Dr ANN has not appts but I can see him at 2pm.  Please add him to my schedule for 2/24 at 2pm.  tx

## 2021-02-22 PROBLEM — Z72.0 TOBACCO ABUSE: Status: ACTIVE | Noted: 2021-02-22

## 2021-02-22 NOTE — TELEPHONE ENCOUNTER
Please call pt and make sure he knows his appt is at [facility] and not [facility]!  Thank you!   Pt / pa: Pt / pa: Pt / pa: Pt / pa: Pt / pa:

## 2021-02-23 NOTE — TELEPHONE ENCOUNTER
vm left for pt stressing appt is at [facility]

## 2021-04-15 ENCOUNTER — OFFICE VISIT (OUTPATIENT)
Dept: CARDIOLOGY | Facility: CLINIC | Age: 56
End: 2021-04-15
Payer: COMMERCIAL

## 2021-04-15 VITALS
WEIGHT: 315 LBS | DIASTOLIC BLOOD PRESSURE: 86 MMHG | HEART RATE: 88 BPM | BODY MASS INDEX: 45.1 KG/M2 | RESPIRATION RATE: 16 BRPM | HEIGHT: 70 IN | TEMPERATURE: 98.6 F | SYSTOLIC BLOOD PRESSURE: 136 MMHG

## 2021-04-15 DIAGNOSIS — I25.10 CORONARY ARTERY DISEASE INVOLVING NATIVE CORONARY ARTERY OF NATIVE HEART WITHOUT ANGINA PECTORIS: Primary | ICD-10-CM

## 2021-04-15 PROCEDURE — 99214 OFFICE O/P EST MOD 30 MIN: CPT | Performed by: INTERNAL MEDICINE

## 2021-04-15 PROCEDURE — 93000 ELECTROCARDIOGRAM COMPLETE: CPT | Performed by: INTERNAL MEDICINE

## 2021-04-15 PROCEDURE — 3008F BODY MASS INDEX DOCD: CPT | Performed by: INTERNAL MEDICINE

## 2021-04-15 RX ORDER — LISINOPRIL 20 MG/1
20 TABLET ORAL DAILY
Qty: 90 TABLET | Refills: 3 | Status: SHIPPED | OUTPATIENT
Start: 2021-04-15 | End: 2021-08-19

## 2021-04-15 ASSESSMENT — ENCOUNTER SYMPTOMS
WEIGHT GAIN: 1
RESPIRATORY NEGATIVE: 1
ALLERGIC/IMMUNOLOGIC NEGATIVE: 1
NEUROLOGICAL NEGATIVE: 1
EYES NEGATIVE: 1
PSYCHIATRIC NEGATIVE: 1
GASTROINTESTINAL NEGATIVE: 1
MUSCULOSKELETAL NEGATIVE: 1
HEMATOLOGIC/LYMPHATIC NEGATIVE: 1
ENDOCRINE NEGATIVE: 1

## 2021-04-15 NOTE — PROGRESS NOTES
Chief Complaint: I saw out of the office today for the first time in a year.  He has some sharp type of reproducible chest pain that does not radiate to his neck or jaw down either arm or into his back.  He does not have chest discomfort chest tightness or shortness of breath like he had prior to his stent in his right coronary artery.  He denies chest discomfort or shortness of breath with anxiety, cold weather, postprandially, with sex, at rest, or nocturnally.  He denies exertional dyspnea, proximal nocturnal dyspnea, orthopnea, palpitations, syncope, ankle edema, he has nocturia.       Medications:  Current Outpatient Medications   Medication Sig Dispense Refill   • albuterol HFA (VENTOLIN HFA) 90 mcg/actuation inhaler inhale ONE TO TWO PUFFS EVERY 6 HOURS AS NEEDED FOR WHEEZING  0   • aspirin 81 mg enteric coated tablet Take 1 tablet (81 mg total) by mouth daily. 90 tablet 3   • clopidogreL (PLAVIX) 75 mg tablet Take 1 tablet (75 mg total) by mouth daily. 90 tablet 3   • hydrochlorothiazide (HYDRODIURIL) 25 mg tablet Take 1 tablet (25 mg total) by mouth once daily. 90 tablet 3   • rosuvastatin (CRESTOR) 20 mg tablet Take 1 tablet (20 mg total) by mouth daily. 90 tablet 3   • umeclidinium-vilanterol (ANORO ELLIPTA) 62.5-25 mcg/actuation blister with device Inhale 1 puff daily.       No current facility-administered medications for this visit.       Review of Systems:  Review of Systems   Constitutional: Positive for weight gain.   HENT: Negative.    Eyes: Negative.    Cardiovascular: Positive for chest pain.   Respiratory: Negative.    Endocrine: Negative.    Hematologic/Lymphatic: Negative.    Skin: Negative.    Musculoskeletal: Negative.    Gastrointestinal: Negative.    Genitourinary: Positive for nocturia.   Neurological: Negative.    Psychiatric/Behavioral: Negative.    Allergic/Immunologic: Negative.        Physical Exam:  Vitals:    04/15/21 1509   BP: 136/86   Pulse: 88   Resp: 16   Temp: 37 °C (98.6  °F)     Physical Exam  Constitutional:       Appearance: He is obese.   Cardiovascular:      Comments: Sounds are distnat  Pulmonary:      Effort: Pulmonary effort is normal.       EKG: SR IVCD PRWP V1-4 ST-Tabn    Assessment and Plan:  Impression:  Chest pain that is costochondritis.  Uncontrolled hypertension.  Weight gain.  Coronary artery disease with a stent in the right coronary artery.  Abnormal EKG.  Obesity.  Recommendation:  I added lisinopril 20 mg once a day in the morning with his HCTZ to control his blood pressure.  We talked about diet we talked about exercise we will see him again in 4 months time.  He is coming down to see you for his lab work.  Please forward to us his lab work letter for our records including his lipid profile and hypersensitive CRP.  Thank you for allowing us to see this very nice gentleman in the office today.    Alexandre Fernando,

## 2021-04-15 NOTE — LETTER
April 15, 2021     Larry N Finkelstein, DO  [address] [address]    Patient: Rinku Maier  YOB: 1965  Date of Visit: 4/15/2021      Dear Dr. Finkelstein:    Thank you for referring Rinku Maier to me for evaluation. Below are my notes for this consultation.    If you have questions, please do not hesitate to call me. I look forward to following your patient along with you.         Sincerely,        Alexandre Fernando DO        CC: No Recipients  Alexandre Fernando DO  4/15/2021  3:55 PM  Signed      Chief Complaint: I saw out of the office today for the first time in a year.  He has some sharp type of reproducible chest pain that does not radiate to his neck or jaw down either arm or into his back.  He does not have chest discomfort chest tightness or shortness of breath like he had prior to his stent in his right coronary artery.  He denies chest discomfort or shortness of breath with anxiety, cold weather, postprandially, with sex, at rest, or nocturnally.  He denies exertional dyspnea, proximal nocturnal dyspnea, orthopnea, palpitations, syncope, ankle edema, he has nocturia.       Medications:  Current Outpatient Medications   Medication Sig Dispense Refill   • albuterol HFA (VENTOLIN HFA) 90 mcg/actuation inhaler inhale ONE TO TWO PUFFS EVERY 6 HOURS AS NEEDED FOR WHEEZING  0   • aspirin 81 mg enteric coated tablet Take 1 tablet (81 mg total) by mouth daily. 90 tablet 3   • clopidogreL (PLAVIX) 75 mg tablet Take 1 tablet (75 mg total) by mouth daily. 90 tablet 3   • hydrochlorothiazide (HYDRODIURIL) 25 mg tablet Take 1 tablet (25 mg total) by mouth once daily. 90 tablet 3   • rosuvastatin (CRESTOR) 20 mg tablet Take 1 tablet (20 mg total) by mouth daily. 90 tablet 3   • umeclidinium-vilanterol (ANORO ELLIPTA) 62.5-25 mcg/actuation blister with device Inhale 1 puff daily.       No current facility-administered medications for this visit.       Review of  Systems:  Review of Systems   Constitutional: Positive for weight gain.   HENT: Negative.    Eyes: Negative.    Cardiovascular: Positive for chest pain.   Respiratory: Negative.    Endocrine: Negative.    Hematologic/Lymphatic: Negative.    Skin: Negative.    Musculoskeletal: Negative.    Gastrointestinal: Negative.    Genitourinary: Positive for nocturia.   Neurological: Negative.    Psychiatric/Behavioral: Negative.    Allergic/Immunologic: Negative.        Physical Exam:  Vitals:    04/15/21 1509   BP: 136/86   Pulse: 88   Resp: 16   Temp: 37 °C (98.6 °F)     Physical Exam  Constitutional:       Appearance: He is obese.   Cardiovascular:      Comments: Sounds are distnat  Pulmonary:      Effort: Pulmonary effort is normal.       EKG: SR IVCD PRWP V1-4 ST-Tabn    Assessment and Plan:  Impression:  Chest pain that is costochondritis.  Uncontrolled hypertension.  Weight gain.  Coronary artery disease with a stent in the right coronary artery.  Abnormal EKG.  Obesity.  Recommendation:  I added lisinopril 20 mg once a day in the morning with his HCTZ to control his blood pressure.  We talked about diet we talked about exercise we will see him again in 4 months time.  He is coming down to see you for his lab work.  Please forward to us his lab work letter for our records including his lipid profile and hypersensitive CRP.  Thank you for allowing us to see this very nice gentleman in the office today.    Alexandre Fernando DO

## 2021-05-17 RX ORDER — ASPIRIN 81 MG/1
TABLET ORAL
Qty: 90 TABLET | Refills: 3 | Status: SHIPPED | OUTPATIENT
Start: 2021-05-17 | End: 2022-03-17

## 2021-06-21 ENCOUNTER — TELEPHONE (OUTPATIENT)
Dept: SCHEDULING | Facility: CLINIC | Age: 56
End: 2021-06-21

## 2021-06-21 NOTE — TELEPHONE ENCOUNTER
Spoke w/RAINE Bucio who agrees pt should go to ED for US.    LM on pt's vm instructing to go to ED for US. Advised to call back w/any questions.    I will try to call pt again later to make sure he goes.

## 2021-06-21 NOTE — TELEPHONE ENCOUNTER
Pt of Dr Fernando w/[facility] of CAD w/stent placed, COPD, htn.    Pt calling today to report Rt leg swelling from knee to toes since 1 wk ago. States pain at times, no change in color or temp. No swelling in Lt leg.     Pt reports this happened approx 2 months ago, swelling for 3 days then went away. Pt states he did go to [facility] at that time and was told he was fine (per pt).     Pt is taking hctz as instructed.     Advised pt to go to ED for evaluation. He states he will go if Dr Fernando advises him to do so.     Pt can be reached at [phone].

## 2021-08-19 ENCOUNTER — OFFICE VISIT (OUTPATIENT)
Dept: CARDIOLOGY | Facility: CLINIC | Age: 56
End: 2021-08-19
Payer: COMMERCIAL

## 2021-08-19 VITALS
RESPIRATION RATE: 17 BRPM | BODY MASS INDEX: 45.1 KG/M2 | HEART RATE: 95 BPM | SYSTOLIC BLOOD PRESSURE: 154 MMHG | DIASTOLIC BLOOD PRESSURE: 80 MMHG | HEIGHT: 70 IN | WEIGHT: 315 LBS

## 2021-08-19 DIAGNOSIS — I25.10 CORONARY ARTERY DISEASE INVOLVING NATIVE CORONARY ARTERY OF NATIVE HEART WITHOUT ANGINA PECTORIS: Primary | ICD-10-CM

## 2021-08-19 PROCEDURE — 99214 OFFICE O/P EST MOD 30 MIN: CPT | Performed by: INTERNAL MEDICINE

## 2021-08-19 PROCEDURE — 93000 ELECTROCARDIOGRAM COMPLETE: CPT | Performed by: INTERNAL MEDICINE

## 2021-08-19 PROCEDURE — 3008F BODY MASS INDEX DOCD: CPT | Performed by: INTERNAL MEDICINE

## 2021-08-19 RX ORDER — LISINOPRIL 40 MG/1
40 TABLET ORAL DAILY
Qty: 90 TABLET | Refills: 3 | Status: SHIPPED | OUTPATIENT
Start: 2021-08-19 | End: 2022-06-13

## 2021-08-19 RX ORDER — CEPHALEXIN 500 MG/1
500 CAPSULE ORAL 4 TIMES DAILY
Qty: 28 CAPSULE | Refills: 0 | Status: SHIPPED | OUTPATIENT
Start: 2021-08-19 | End: 2021-08-26

## 2021-08-19 ASSESSMENT — ENCOUNTER SYMPTOMS
PSYCHIATRIC NEGATIVE: 1
EYES NEGATIVE: 1
BACK PAIN: 1
RESPIRATORY NEGATIVE: 1
NEUROLOGICAL NEGATIVE: 1
ENDOCRINE NEGATIVE: 1
HEMATOLOGIC/LYMPHATIC NEGATIVE: 1
DYSPNEA ON EXERTION: 1
GASTROINTESTINAL NEGATIVE: 1

## 2021-08-19 NOTE — LETTER
August 19, 2021     Larry N Finkelstein, DO  [address] [address]    Patient: Rinku Maier  YOB: 1965  Date of Visit: 8/19/2021      Dear Dr. Finkelstein:    Thank you for referring Rinku Maier to me for evaluation. Below are my notes for this consultation.    If you have questions, please do not hesitate to call me. I look forward to following your patient along with you.         Sincerely,        Alexandre Fernando DO        CC: No Recipients  Alexandre Fernando DO  8/19/2021  4:00 PM  Signed      Chief Complaint: I saw Rinku in the office today on a routine visit for his known coronary artery disease.  He is complaining of right leg swelling that looks like he has got some fluid on his leg particularly in his knee and some cellulitis of the right lower extremity.    He denies chest discomfort but has shortness of breath with exertion, not anxiety, cold weather, postprandially, with sex, at rest, or nocturnally.  He can climb a flight of stairs without getting short of breath, he denies proximal nocturnal dyspnea, orthopnea, palpitations, syncope, ankle edema, he has nocturia.  He has gained over 10 pounds he is drinking a lot of sweetened iced tea and soda.       Medications:  Current Outpatient Medications   Medication Sig Dispense Refill   • albuterol HFA (VENTOLIN HFA) 90 mcg/actuation inhaler inhale ONE TO TWO PUFFS EVERY 6 HOURS AS NEEDED FOR WHEEZING  0   • aspirin 81 mg enteric coated tablet TAKE ONE TABLET BY MOUTH DAILY 90 tablet 3   • hydrochlorothiazide (HYDRODIURIL) 25 mg tablet Take 1 tablet (25 mg total) by mouth once daily. 90 tablet 3   • lisinopriL (PRINIVIL) 20 mg tablet Take 1 tablet (20 mg total) by mouth daily. 90 tablet 3   • rosuvastatin (CRESTOR) 20 mg tablet Take 1 tablet (20 mg total) by mouth daily. 90 tablet 3   • umeclidinium-vilanterol (ANORO ELLIPTA) 62.5-25 mcg/actuation blister with device Inhale 1 puff daily.       No current  facility-administered medications for this visit.       Review of Systems:  Review of Systems   HENT: Positive for congestion and hearing loss.    Eyes: Negative.    Cardiovascular: Positive for dyspnea on exertion and leg swelling.   Respiratory: Negative.    Endocrine: Negative.    Hematologic/Lymphatic: Negative.    Skin: Positive for rash.   Musculoskeletal: Positive for back pain and joint pain.   Gastrointestinal: Negative.    Genitourinary: Positive for nocturia.   Neurological: Negative.    Psychiatric/Behavioral: Negative.    Allergic/Immunologic: Positive for environmental allergies.       Physical Exam:  Vitals:    08/19/21 1503   BP: (!) 154/80   Pulse: 95   Resp: 17     Physical Exam  Constitutional:       Appearance: He is well-developed. He is obese.   HENT:      Head: Normocephalic and atraumatic.   Eyes:      Conjunctiva/sclera: Conjunctivae normal.      Pupils: Pupils are equal, round, and reactive to light.   Cardiovascular:      Rate and Rhythm: Normal rate and regular rhythm.      Pulses: Intact distal pulses.      Heart sounds: Gallop present.    Pulmonary:      Effort: Pulmonary effort is normal.      Breath sounds: Normal breath sounds.   Abdominal:      General: Bowel sounds are normal.      Palpations: Abdomen is soft.   Musculoskeletal:         General: Normal range of motion.      Cervical back: Normal range of motion and neck supple.      Right lower leg: Edema present.      Left lower leg: Edema present.      Comments: Cellulitis of right l0wer ext.   Skin:     General: Skin is warm and dry.   Neurological:      Mental Status: He is alert and oriented to person, place, and time.      Deep Tendon Reflexes: Reflexes are normal and symmetric.   Psychiatric:         Speech: Speech normal.         Behavior: Behavior normal.         Thought Content: Thought content normal.         Judgment: Judgment normal.       EKG: SR ST-Tabn no change    Assessment and Plan:  Impression:  Coronary artery  disease with a stent in the right coronary artery.  Obesity.  Uncontrolled hypertension.  Hypercholesterolemia.  Cellulitis of right lower extremity.  Recommendation:  I increased his lisinopril to 40 mg once a day to help control his blood pressure.  I started him on Keflex 500 mg every 8 for 7 days to take care of the cellulitis.  I counseled him on diet and exercise.  I gave him the name of Ira Sacks to have his knee evaluated.  We will see him again in 4 months time, if there is a problem we will see him sooner.  Thank you for allowing us see this very nice gentleman in the office today.    Alexandre Fernando, DO

## 2021-08-19 NOTE — PROGRESS NOTES
Chief Complaint: I saw Rinku in the office today on a routine visit for his known coronary artery disease.  He is complaining of right leg swelling that looks like he has got some fluid on his leg particularly in his knee and some cellulitis of the right lower extremity.    He denies chest discomfort but has shortness of breath with exertion, not anxiety, cold weather, postprandially, with sex, at rest, or nocturnally.  He can climb a flight of stairs without getting short of breath, he denies proximal nocturnal dyspnea, orthopnea, palpitations, syncope, ankle edema, he has nocturia.  He has gained over 10 pounds he is drinking a lot of sweetened iced tea and soda.       Medications:  Current Outpatient Medications   Medication Sig Dispense Refill   • albuterol HFA (VENTOLIN HFA) 90 mcg/actuation inhaler inhale ONE TO TWO PUFFS EVERY 6 HOURS AS NEEDED FOR WHEEZING  0   • aspirin 81 mg enteric coated tablet TAKE ONE TABLET BY MOUTH DAILY 90 tablet 3   • hydrochlorothiazide (HYDRODIURIL) 25 mg tablet Take 1 tablet (25 mg total) by mouth once daily. 90 tablet 3   • lisinopriL (PRINIVIL) 20 mg tablet Take 1 tablet (20 mg total) by mouth daily. 90 tablet 3   • rosuvastatin (CRESTOR) 20 mg tablet Take 1 tablet (20 mg total) by mouth daily. 90 tablet 3   • umeclidinium-vilanterol (ANORO ELLIPTA) 62.5-25 mcg/actuation blister with device Inhale 1 puff daily.       No current facility-administered medications for this visit.       Review of Systems:  Review of Systems   HENT: Positive for congestion and hearing loss.    Eyes: Negative.    Cardiovascular: Positive for dyspnea on exertion and leg swelling.   Respiratory: Negative.    Endocrine: Negative.    Hematologic/Lymphatic: Negative.    Skin: Positive for rash.   Musculoskeletal: Positive for back pain and joint pain.   Gastrointestinal: Negative.    Genitourinary: Positive for nocturia.   Neurological: Negative.    Psychiatric/Behavioral: Negative.     Allergic/Immunologic: Positive for environmental allergies.       Physical Exam:  Vitals:    08/19/21 1503   BP: (!) 154/80   Pulse: 95   Resp: 17     Physical Exam  Constitutional:       Appearance: He is well-developed. He is obese.   HENT:      Head: Normocephalic and atraumatic.   Eyes:      Conjunctiva/sclera: Conjunctivae normal.      Pupils: Pupils are equal, round, and reactive to light.   Cardiovascular:      Rate and Rhythm: Normal rate and regular rhythm.      Pulses: Intact distal pulses.      Heart sounds: Gallop present.    Pulmonary:      Effort: Pulmonary effort is normal.      Breath sounds: Normal breath sounds.   Abdominal:      General: Bowel sounds are normal.      Palpations: Abdomen is soft.   Musculoskeletal:         General: Normal range of motion.      Cervical back: Normal range of motion and neck supple.      Right lower leg: Edema present.      Left lower leg: Edema present.      Comments: Cellulitis of right l0wer ext.   Skin:     General: Skin is warm and dry.   Neurological:      Mental Status: He is alert and oriented to person, place, and time.      Deep Tendon Reflexes: Reflexes are normal and symmetric.   Psychiatric:         Speech: Speech normal.         Behavior: Behavior normal.         Thought Content: Thought content normal.         Judgment: Judgment normal.       EKG: SR ST-Tabn no change    Assessment and Plan:  Impression:  Coronary artery disease with a stent in the right coronary artery.  Obesity.  Uncontrolled hypertension.  Hypercholesterolemia.  Cellulitis of right lower extremity.  Recommendation:  I increased his lisinopril to 40 mg once a day to help control his blood pressure.  I started him on Keflex 500 mg every 8 for 7 days to take care of the cellulitis.  I counseled him on diet and exercise.  I gave him the name of Ira Sacks to have his knee evaluated.  We will see him again in 4 months time, if there is a problem we will see him sooner.  Thank you for  allowing us see this very nice gentleman in the office today.    Alexandre Fernando, DO

## 2022-01-01 ENCOUNTER — APPOINTMENT (RX ONLY)
Dept: URBAN - METROPOLITAN AREA CLINIC 28 | Facility: CLINIC | Age: 57
Setting detail: DERMATOLOGY
End: 2022-01-01

## 2022-01-01 ENCOUNTER — RX ONLY (OUTPATIENT)
Age: 57
Setting detail: RX ONLY
End: 2022-01-01

## 2022-01-01 DIAGNOSIS — I87.2 VENOUS INSUFFICIENCY (CHRONIC) (PERIPHERAL): ICD-10-CM | Status: IMPROVED

## 2022-01-01 DIAGNOSIS — I87.2 VENOUS INSUFFICIENCY (CHRONIC) (PERIPHERAL): ICD-10-CM | Status: INADEQUATELY CONTROLLED

## 2022-01-01 PROCEDURE — ? COUNSELING

## 2022-01-01 PROCEDURE — ? PRESCRIPTION

## 2022-01-01 PROCEDURE — ? COUNSELING: TOPICAL STEROIDS

## 2022-01-01 PROCEDURE — ? ADDITIONAL NOTES

## 2022-01-01 PROCEDURE — ? PRESCRIPTION MEDICATION MANAGEMENT

## 2022-01-01 PROCEDURE — ? PHOTO-DOCUMENTATION

## 2022-01-01 PROCEDURE — 99204 OFFICE O/P NEW MOD 45 MIN: CPT

## 2022-01-01 PROCEDURE — 99214 OFFICE O/P EST MOD 30 MIN: CPT

## 2022-01-01 RX ORDER — UREA 450 MG/G
LOTION TOPICAL
Qty: 225 | Refills: 4 | Status: CANCELLED | COMMUNITY
Start: 2022-01-01

## 2022-01-01 RX ORDER — SILVER SULFADIAZINE 10 MG/G
CREAM TOPICAL
Qty: 50 | Refills: 3 | Status: ERX | COMMUNITY
Start: 2022-01-01

## 2022-01-01 RX ORDER — UREA 25 %
LOTION (ML) TOPICAL QDAY
Qty: 80 | Refills: 3 | Status: CANCELLED

## 2022-01-01 RX ORDER — TRIAMCINOLONE ACETONIDE 1 MG/G
CREAM TOPICAL QDAY
Qty: 545 | Refills: 3 | Status: ERX | COMMUNITY
Start: 2022-01-01

## 2022-01-01 RX ORDER — CHLORHEXIDINE GLUCONATE 213 G/1000ML
SOLUTION TOPICAL
Qty: 946 | Refills: 3 | Status: ERX | COMMUNITY
Start: 2022-01-01

## 2022-01-01 RX ORDER — UREA 25 %
LOTION (ML) TOPICAL QDAY
Qty: 80 | Refills: 3 | Status: CANCELLED | COMMUNITY
Start: 2022-01-01

## 2022-01-01 RX ADMIN — TRIAMCINOLONE ACETONIDE: 1 CREAM TOPICAL at 00:00

## 2022-01-01 RX ADMIN — SILVER SULFADIAZINE: 10 CREAM TOPICAL at 00:00

## 2022-01-01 RX ADMIN — CHLORHEXIDINE GLUCONATE: 213 SOLUTION TOPICAL at 00:00

## 2022-01-01 RX ADMIN — Medication: at 00:00

## 2022-01-01 ASSESSMENT — LOCATION SIMPLE DESCRIPTION DERM
LOCATION SIMPLE: LEFT CALF
LOCATION SIMPLE: RIGHT CALF
LOCATION SIMPLE: RIGHT CALF
LOCATION SIMPLE: LEFT CALF

## 2022-01-01 ASSESSMENT — LOCATION ZONE DERM
LOCATION ZONE: LEG
LOCATION ZONE: LEG

## 2022-01-01 ASSESSMENT — LOCATION DETAILED DESCRIPTION DERM
LOCATION DETAILED: RIGHT DISTAL CALF
LOCATION DETAILED: LEFT DISTAL CALF
LOCATION DETAILED: RIGHT DISTAL CALF
LOCATION DETAILED: LEFT DISTAL CALF

## 2022-01-20 ENCOUNTER — OFFICE VISIT (OUTPATIENT)
Dept: CARDIOLOGY | Facility: CLINIC | Age: 57
End: 2022-01-20
Payer: COMMERCIAL

## 2022-01-20 VITALS
HEART RATE: 99 BPM | RESPIRATION RATE: 18 BRPM | WEIGHT: 315 LBS | HEIGHT: 70 IN | BODY MASS INDEX: 45.1 KG/M2 | DIASTOLIC BLOOD PRESSURE: 80 MMHG | SYSTOLIC BLOOD PRESSURE: 146 MMHG

## 2022-01-20 DIAGNOSIS — I82.5Z3 CHRONIC DEEP VEIN THROMBOSIS (DVT) OF DISTAL VEIN OF BOTH LOWER EXTREMITIES (CMS/HCC): ICD-10-CM

## 2022-01-20 DIAGNOSIS — I25.10 CORONARY ARTERY DISEASE INVOLVING NATIVE CORONARY ARTERY OF NATIVE HEART WITHOUT ANGINA PECTORIS: Primary | ICD-10-CM

## 2022-01-20 PROBLEM — L03.90 CELLULITIS: Status: ACTIVE | Noted: 2022-01-20

## 2022-01-20 PROCEDURE — 99214 OFFICE O/P EST MOD 30 MIN: CPT | Performed by: INTERNAL MEDICINE

## 2022-01-20 PROCEDURE — 93000 ELECTROCARDIOGRAM COMPLETE: CPT | Performed by: INTERNAL MEDICINE

## 2022-01-20 PROCEDURE — 3008F BODY MASS INDEX DOCD: CPT | Performed by: INTERNAL MEDICINE

## 2022-01-20 RX ORDER — FUROSEMIDE 20 MG/1
40 TABLET ORAL
COMMUNITY
Start: 2021-12-03 | End: 2022-01-20 | Stop reason: SDUPTHER

## 2022-01-20 RX ORDER — FUROSEMIDE 20 MG/1
40 TABLET ORAL
Qty: 180 TABLET | Refills: 3 | Status: SHIPPED | OUTPATIENT
Start: 2022-01-20 | End: 2022-09-15

## 2022-01-20 RX ORDER — CEPHALEXIN 500 MG/1
500 CAPSULE ORAL 4 TIMES DAILY
Qty: 28 CAPSULE | Refills: 0 | Status: SHIPPED | OUTPATIENT
Start: 2022-01-20 | End: 2022-01-27

## 2022-01-20 RX ORDER — CLOPIDOGREL BISULFATE 75 MG/1
75 TABLET ORAL
COMMUNITY
Start: 2021-12-03 | End: 2022-01-20

## 2022-01-20 ASSESSMENT — ENCOUNTER SYMPTOMS
DYSPNEA ON EXERTION: 1
GASTROINTESTINAL NEGATIVE: 1
EYES NEGATIVE: 1
PSYCHIATRIC NEGATIVE: 1
ENDOCRINE NEGATIVE: 1
NEUROLOGICAL NEGATIVE: 1
RESPIRATORY NEGATIVE: 1
WEIGHT GAIN: 1
HEMATOLOGIC/LYMPHATIC NEGATIVE: 1

## 2022-01-20 NOTE — LETTER
January 20, 2022     Larry N Finkelstein, DO  [address] [address]    Patient: Rinku Maier  YOB: 1965  Date of Visit: 1/20/2022      Dear Dr. Finkelstein:    Thank you for referring Rinku Maier to me for evaluation. Below are my notes for this consultation.    If you have questions, please do not hesitate to call me. I look forward to following your patient along with you.         Sincerely,        Alexandre Fernando DO        CC: No Recipients  Alexandre Fernando DO  1/20/2022  4:17 PM  Signed      Chief Complaint: I saw Rinku today on a routine visit and he is complaining a lot of swollen legs painful legs and severe joint discomfort of both his knees.  His legs and the skin of his legs are very hard dry some erythema but no true pitting edema.  The legs are painful for the touch there is some swelling but no pitting edema.  His knees are swollen as well.  He denies chest discomfort but has shortness of breath with exertion but he has gained a tremendous amount of weight.  He denies chest discomfort or shortness of breath with anxiety cold weather postprandially at rest or nocturnally.  He does not climb stairs presently he states.  He denies proximal nocturnal dyspnea orthopnea palpitations syncope he has nocturia.     Medications:  Current Outpatient Medications   Medication Sig Dispense Refill   • albuterol HFA (VENTOLIN HFA) 90 mcg/actuation inhaler inhale ONE TO TWO PUFFS EVERY 6 HOURS AS NEEDED FOR WHEEZING  0   • aspirin 81 mg enteric coated tablet TAKE ONE TABLET BY MOUTH DAILY 90 tablet 3   • clopidogreL 75 mg tablet Take 75 mg by mouth once daily.     • furosemide 20 mg tablet Take 20 mg by mouth once daily.     • hydrochlorothiazide (HYDRODIURIL) 25 mg tablet Take 1 tablet (25 mg total) by mouth once daily. 90 tablet 3   • lisinopriL (PRINIVIL) 40 mg tablet Take 1 tablet (40 mg total) by mouth daily. 90 tablet 3   • rosuvastatin (CRESTOR) 20 mg tablet  Take 1 tablet (20 mg total) by mouth daily. 90 tablet 3   • umeclidinium-vilanterol (ANORO ELLIPTA) 62.5-25 mcg/actuation blister with device Inhale 1 puff daily.       No current facility-administered medications for this visit.       Review of Systems:  Review of Systems   Constitutional: Positive for weight gain.   HENT: Negative.    Eyes: Negative.    Cardiovascular: Positive for dyspnea on exertion and leg swelling.   Respiratory: Negative.    Endocrine: Negative.    Hematologic/Lymphatic: Negative.    Skin: Positive for rash.   Musculoskeletal: Positive for joint pain.   Gastrointestinal: Negative.    Genitourinary: Positive for nocturia.   Neurological: Negative.    Psychiatric/Behavioral: Negative.    Allergic/Immunologic: Positive for environmental allergies.       Physical Exam:  Vitals:    01/20/22 1508   BP: (!) 146/80   Pulse: 99   Resp: 18     Physical Exam  Constitutional:       Appearance: He is well-developed. He is obese.   HENT:      Head: Normocephalic and atraumatic.   Eyes:      Conjunctiva/sclera: Conjunctivae normal.      Pupils: Pupils are equal, round, and reactive to light.   Cardiovascular:      Rate and Rhythm: Normal rate and regular rhythm.      Pulses: Intact distal pulses.      Heart sounds: Normal heart sounds.   Pulmonary:      Effort: Pulmonary effort is normal.      Breath sounds: Wheezing present.   Abdominal:      General: Bowel sounds are normal.      Palpations: Abdomen is soft.   Musculoskeletal:         General: Normal range of motion.      Cervical back: Normal range of motion and neck supple.   Skin:     General: Skin is warm and dry.      Findings: Erythema present.   Neurological:      Mental Status: He is alert and oriented to person, place, and time.      Deep Tendon Reflexes: Reflexes are normal and symmetric.   Psychiatric:         Speech: Speech normal.         Behavior: Behavior normal.         Thought Content: Thought content normal.         Judgment: Judgment  normal.       EKG: SR IVCD ST-Tabn    Assessment and Plan:  Impression:  Bilateral lower extremity edema that I believe is cellulitis so I started him on Keflex 500 mg 4 times daily for 7 days.  I will order venous Dopplers of his lower extremities to rule out chronic DVTs.  I ordered complete set of blood work CBC CMP magnesium and thyroid functions looking for any metabolic causes of his progressive shortness of breath as well as his lower extremity edema.  I discontinued his clopidogrel because its been over a year since his stent I discontinued his HydroDIURIL and just increased his Lasix to 40 mg daily for the swelling.  We will see him in 4 weeks time.  Thank you for allowing to see this interesting case.    Alexandre Fernando, DO

## 2022-01-20 NOTE — PROGRESS NOTES
Chief Complaint: I saw Rinku today on a routine visit and he is complaining a lot of swollen legs painful legs and severe joint discomfort of both his knees.  His legs and the skin of his legs are very hard dry some erythema but no true pitting edema.  The legs are painful for the touch there is some swelling but no pitting edema.  His knees are swollen as well.  He denies chest discomfort but has shortness of breath with exertion but he has gained a tremendous amount of weight.  He denies chest discomfort or shortness of breath with anxiety cold weather postprandially at rest or nocturnally.  He does not climb stairs presently he states.  He denies proximal nocturnal dyspnea orthopnea palpitations syncope he has nocturia.     Medications:  Current Outpatient Medications   Medication Sig Dispense Refill   • albuterol HFA (VENTOLIN HFA) 90 mcg/actuation inhaler inhale ONE TO TWO PUFFS EVERY 6 HOURS AS NEEDED FOR WHEEZING  0   • aspirin 81 mg enteric coated tablet TAKE ONE TABLET BY MOUTH DAILY 90 tablet 3   • clopidogreL 75 mg tablet Take 75 mg by mouth once daily.     • furosemide 20 mg tablet Take 20 mg by mouth once daily.     • hydrochlorothiazide (HYDRODIURIL) 25 mg tablet Take 1 tablet (25 mg total) by mouth once daily. 90 tablet 3   • lisinopriL (PRINIVIL) 40 mg tablet Take 1 tablet (40 mg total) by mouth daily. 90 tablet 3   • rosuvastatin (CRESTOR) 20 mg tablet Take 1 tablet (20 mg total) by mouth daily. 90 tablet 3   • umeclidinium-vilanterol (ANORO ELLIPTA) 62.5-25 mcg/actuation blister with device Inhale 1 puff daily.       No current facility-administered medications for this visit.       Review of Systems:  Review of Systems   Constitutional: Positive for weight gain.   HENT: Negative.    Eyes: Negative.    Cardiovascular: Positive for dyspnea on exertion and leg swelling.   Respiratory: Negative.    Endocrine: Negative.    Hematologic/Lymphatic: Negative.    Skin: Positive for rash.    Musculoskeletal: Positive for joint pain.   Gastrointestinal: Negative.    Genitourinary: Positive for nocturia.   Neurological: Negative.    Psychiatric/Behavioral: Negative.    Allergic/Immunologic: Positive for environmental allergies.       Physical Exam:  Vitals:    01/20/22 1508   BP: (!) 146/80   Pulse: 99   Resp: 18     Physical Exam  Constitutional:       Appearance: He is well-developed. He is obese.   HENT:      Head: Normocephalic and atraumatic.   Eyes:      Conjunctiva/sclera: Conjunctivae normal.      Pupils: Pupils are equal, round, and reactive to light.   Cardiovascular:      Rate and Rhythm: Normal rate and regular rhythm.      Pulses: Intact distal pulses.      Heart sounds: Normal heart sounds.   Pulmonary:      Effort: Pulmonary effort is normal.      Breath sounds: Wheezing present.   Abdominal:      General: Bowel sounds are normal.      Palpations: Abdomen is soft.   Musculoskeletal:         General: Normal range of motion.      Cervical back: Normal range of motion and neck supple.   Skin:     General: Skin is warm and dry.      Findings: Erythema present.   Neurological:      Mental Status: He is alert and oriented to person, place, and time.      Deep Tendon Reflexes: Reflexes are normal and symmetric.   Psychiatric:         Speech: Speech normal.         Behavior: Behavior normal.         Thought Content: Thought content normal.         Judgment: Judgment normal.       EKG: SR IVCD ST-Tabn    Assessment and Plan:  Impression:  Bilateral lower extremity edema that I believe is cellulitis so I started him on Keflex 500 mg 4 times daily for 7 days.  I will order venous Dopplers of his lower extremities to rule out chronic DVTs.  I ordered complete set of blood work CBC CMP magnesium and thyroid functions looking for any metabolic causes of his progressive shortness of breath as well as his lower extremity edema.  I discontinued his clopidogrel because its been over a year since his stent  I discontinued his HydroDIURIL and just increased his Lasix to 40 mg daily for the swelling.  We will see him in 4 weeks time.  Thank you for allowing to see this interesting case.    Alexandre Fernando, DO

## 2022-02-03 ENCOUNTER — HOSPITAL ENCOUNTER (OUTPATIENT)
Dept: CARDIOLOGY | Facility: CLINIC | Age: 57
Discharge: HOME | End: 2022-02-03
Attending: INTERNAL MEDICINE
Payer: COMMERCIAL

## 2022-02-03 VITALS — HEIGHT: 70 IN | WEIGHT: 315 LBS | BODY MASS INDEX: 45.1 KG/M2

## 2022-02-03 DIAGNOSIS — I82.5Z3 CHRONIC DEEP VEIN THROMBOSIS (DVT) OF DISTAL VEIN OF BOTH LOWER EXTREMITIES (CMS/HCC): ICD-10-CM

## 2022-02-03 PROCEDURE — 93970 EXTREMITY STUDY: CPT | Performed by: SURGERY

## 2022-02-04 LAB — BSA FOR ECHO PROCEDURE: 2.83 M2

## 2022-02-10 ENCOUNTER — OFFICE VISIT (OUTPATIENT)
Dept: CARDIOLOGY | Facility: CLINIC | Age: 57
End: 2022-02-10
Payer: COMMERCIAL

## 2022-02-10 VITALS
HEIGHT: 70 IN | DIASTOLIC BLOOD PRESSURE: 76 MMHG | BODY MASS INDEX: 45.1 KG/M2 | RESPIRATION RATE: 14 BRPM | HEART RATE: 68 BPM | WEIGHT: 315 LBS | SYSTOLIC BLOOD PRESSURE: 118 MMHG | TEMPERATURE: 98.6 F

## 2022-02-10 DIAGNOSIS — R06.02 SHORTNESS OF BREATH: Primary | ICD-10-CM

## 2022-02-10 PROCEDURE — 3008F BODY MASS INDEX DOCD: CPT | Performed by: INTERNAL MEDICINE

## 2022-02-10 PROCEDURE — 99214 OFFICE O/P EST MOD 30 MIN: CPT | Performed by: INTERNAL MEDICINE

## 2022-02-10 RX ORDER — SPIRONOLACTONE 25 MG/1
25 TABLET ORAL DAILY
Qty: 90 TABLET | Refills: 1 | Status: SHIPPED | OUTPATIENT
Start: 2022-02-10 | End: 2022-06-10

## 2022-02-10 ASSESSMENT — ENCOUNTER SYMPTOMS
DYSPNEA ON EXERTION: 1
ENDOCRINE NEGATIVE: 1
MYALGIAS: 1
WEIGHT GAIN: 1
EYES NEGATIVE: 1
BRUISES/BLEEDS EASILY: 1
PSYCHIATRIC NEGATIVE: 1
SHORTNESS OF BREATH: 1
NEUROLOGICAL NEGATIVE: 1
GASTROINTESTINAL NEGATIVE: 1

## 2022-02-10 NOTE — PROGRESS NOTES
Chief Complaint: I saw Rinku in the office today in follow-up for his lower extremity edema and cellulitis.  We did a venous Doppler on him on both legs there is no clots found.  However he still has some stasis dermatitis and erythema his legs are still swollen but the leg itself is not as hard but remains tender to the touch.    He denies chest comfort or tightness but has a reproducible chest pain that gets worse with coughing and inspiration this is costochondritis.  He is short of breath with exertion not anxiety cold weather postprandially at rest or nocturnally.  He is short of breath climbing the stairs, he denies proximal nocturnal dyspnea orthopnea palpitations syncope he has lower extremity edema from mid thigh to feet bilaterally and he has nocturia.       Medications:  Current Outpatient Medications   Medication Sig Dispense Refill   • albuterol HFA (VENTOLIN HFA) 90 mcg/actuation inhaler inhale ONE TO TWO PUFFS EVERY 6 HOURS AS NEEDED FOR WHEEZING  0   • aspirin 81 mg enteric coated tablet TAKE ONE TABLET BY MOUTH DAILY 90 tablet 3   • furosemide 20 mg tablet Take 2 tablets (40 mg total) by mouth once daily. 180 tablet 3   • lisinopriL (PRINIVIL) 40 mg tablet Take 1 tablet (40 mg total) by mouth daily. 90 tablet 3   • rosuvastatin (CRESTOR) 20 mg tablet Take 1 tablet (20 mg total) by mouth daily. 90 tablet 3   • umeclidinium-vilanterol (ANORO ELLIPTA) 62.5-25 mcg/actuation blister with device Inhale 1 puff daily.       No current facility-administered medications for this visit.       Review of Systems:  Review of Systems   Constitutional: Positive for weight gain.   HENT: Positive for congestion.    Eyes: Negative.    Cardiovascular: Positive for chest pain, dyspnea on exertion and leg swelling.   Respiratory: Positive for shortness of breath.    Endocrine: Negative.    Hematologic/Lymphatic: Bruises/bleeds easily.   Skin: Positive for rash.   Musculoskeletal: Positive for joint pain and  myalgias.   Gastrointestinal: Negative.    Genitourinary: Positive for nocturia.   Neurological: Negative.    Psychiatric/Behavioral: Negative.    Allergic/Immunologic: Positive for environmental allergies.       Physical Exam:  Vitals:    02/10/22 1526   BP: 118/76   Pulse: 68   Resp: 14   Temp: 37 °C (98.6 °F)     Physical Exam  Constitutional:       Appearance: He is well-developed. He is obese.   HENT:      Head: Normocephalic and atraumatic.   Eyes:      Conjunctiva/sclera: Conjunctivae normal.      Pupils: Pupils are equal, round, and reactive to light.   Cardiovascular:      Rate and Rhythm: Normal rate and regular rhythm.      Pulses: Intact distal pulses.      Heart sounds:     Gallop present.   Pulmonary:      Effort: Pulmonary effort is normal.      Breath sounds: Normal breath sounds.   Abdominal:      General: Bowel sounds are normal.      Palpations: Abdomen is soft.   Musculoskeletal:         General: Normal range of motion.      Cervical back: Normal range of motion and neck supple.   Skin:     General: Skin is warm and dry.      Findings: Erythema present.      Comments: Dry and scaly   Neurological:      Mental Status: He is alert and oriented to person, place, and time.      Deep Tendon Reflexes: Reflexes are normal and symmetric.   Psychiatric:         Speech: Speech normal.         Behavior: Behavior normal.         Thought Content: Thought content normal.         Judgment: Judgment normal.       EKG: not    Assessment and Plan:  Impression:  Venous stasis and insufficiency.  Erythema of the lower extremities.  Obesity.  Hypertension controlled.  Coronary artery disease with stent in the right coronary artery.  Costochondritis.  History of tobacco abuse.  COPD.  Recommendation:  He did not get the lab work done I ordered he will get it done this week he states.  I added spironolactone 25 mg once a day to his Lasix 40 mg daily I asked him to continue Lasix for 1 week till the spironolactone  starts to take effect and then decrease his Lasix to 20 mg daily and stay on the spironolactone 25 mg daily.  We will see him in 3 months time, if there is a problem we will see him sooner.  We also referred him to the our wound care center for his edema and skin changes.  Thank you for allowing us see this interesting case in the office today.    Alexandre Fernando, DO

## 2022-02-10 NOTE — LETTER
February 10, 2022     Larry N Finkelstein, DO  [address] [address]    Patient: Rinku Maier  YOB: 1965  Date of Visit: 2/10/2022      Dear Dr. Finkelstein:    Thank you for referring Rinku Maier to me for evaluation. Below are my notes for this consultation.    If you have questions, please do not hesitate to call me. I look forward to following your patient along with you.         Sincerely,        Alexandre Fernando DO        CC: No Recipients  Alexandre Fernando DO  2/10/2022  4:06 PM  Signed      Chief Complaint: I saw Rinku in the office today in follow-up for his lower extremity edema and cellulitis.  We did a venous Doppler on him on both legs there is no clots found.  However he still has some stasis dermatitis and erythema his legs are still swollen but the leg itself is not as hard but remains tender to the touch.    He denies chest comfort or tightness but has a reproducible chest pain that gets worse with coughing and inspiration this is costochondritis.  He is short of breath with exertion not anxiety cold weather postprandially at rest or nocturnally.  He is short of breath climbing the stairs, he denies proximal nocturnal dyspnea orthopnea palpitations syncope he has lower extremity edema from mid thigh to feet bilaterally and he has nocturia.       Medications:  Current Outpatient Medications   Medication Sig Dispense Refill   • albuterol HFA (VENTOLIN HFA) 90 mcg/actuation inhaler inhale ONE TO TWO PUFFS EVERY 6 HOURS AS NEEDED FOR WHEEZING  0   • aspirin 81 mg enteric coated tablet TAKE ONE TABLET BY MOUTH DAILY 90 tablet 3   • furosemide 20 mg tablet Take 2 tablets (40 mg total) by mouth once daily. 180 tablet 3   • lisinopriL (PRINIVIL) 40 mg tablet Take 1 tablet (40 mg total) by mouth daily. 90 tablet 3   • rosuvastatin (CRESTOR) 20 mg tablet Take 1 tablet (20 mg total) by mouth daily. 90 tablet 3   • umeclidinium-vilanterol (ANORO ELLIPTA)  62.5-25 mcg/actuation blister with device Inhale 1 puff daily.       No current facility-administered medications for this visit.       Review of Systems:  Review of Systems   Constitutional: Positive for weight gain.   HENT: Positive for congestion.    Eyes: Negative.    Cardiovascular: Positive for chest pain, dyspnea on exertion and leg swelling.   Respiratory: Positive for shortness of breath.    Endocrine: Negative.    Hematologic/Lymphatic: Bruises/bleeds easily.   Skin: Positive for rash.   Musculoskeletal: Positive for joint pain and myalgias.   Gastrointestinal: Negative.    Genitourinary: Positive for nocturia.   Neurological: Negative.    Psychiatric/Behavioral: Negative.    Allergic/Immunologic: Positive for environmental allergies.       Physical Exam:  Vitals:    02/10/22 1526   BP: 118/76   Pulse: 68   Resp: 14   Temp: 37 °C (98.6 °F)     Physical Exam  Constitutional:       Appearance: He is well-developed. He is obese.   HENT:      Head: Normocephalic and atraumatic.   Eyes:      Conjunctiva/sclera: Conjunctivae normal.      Pupils: Pupils are equal, round, and reactive to light.   Cardiovascular:      Rate and Rhythm: Normal rate and regular rhythm.      Pulses: Intact distal pulses.      Heart sounds:     Gallop present.   Pulmonary:      Effort: Pulmonary effort is normal.      Breath sounds: Normal breath sounds.   Abdominal:      General: Bowel sounds are normal.      Palpations: Abdomen is soft.   Musculoskeletal:         General: Normal range of motion.      Cervical back: Normal range of motion and neck supple.   Skin:     General: Skin is warm and dry.      Findings: Erythema present.      Comments: Dry and scaly   Neurological:      Mental Status: He is alert and oriented to person, place, and time.      Deep Tendon Reflexes: Reflexes are normal and symmetric.   Psychiatric:         Speech: Speech normal.         Behavior: Behavior normal.         Thought Content: Thought content normal.          Judgment: Judgment normal.       EKG: not    Assessment and Plan:  Impression:  Venous stasis and insufficiency.  Erythema of the lower extremities.  Obesity.  Hypertension controlled.  Coronary artery disease with stent in the right coronary artery.  Costochondritis.  History of tobacco abuse.  COPD.  Recommendation:  He did not get the lab work done I ordered he will get it done this week he states.  I added spironolactone 25 mg once a day to his Lasix 40 mg daily I asked him to continue Lasix for 1 week till the spironolactone starts to take effect and then decrease his Lasix to 20 mg daily and stay on the spironolactone 25 mg daily.  We will see him in 3 months time, if there is a problem we will see him sooner.  We also referred him to the our wound care center for his edema and skin changes.  Thank you for allowing us see this interesting case in the office today.    Alexandre Fernando DO

## 2022-02-17 PROBLEM — I87.2 CHRONIC VENOUS STASIS DERMATITIS OF BOTH LOWER EXTREMITIES: Status: ACTIVE | Noted: 2022-02-17

## 2022-02-17 PROBLEM — R60.0 EDEMA OF BOTH LOWER EXTREMITIES: Status: ACTIVE | Noted: 2022-02-17

## 2022-02-17 PROBLEM — I87.309 CHRONIC PERIPHERAL VENOUS HYPERTENSION: Status: ACTIVE | Noted: 2022-02-17

## 2022-02-17 PROBLEM — L90.9 ATROPHIC CONDITION OF SKIN: Status: ACTIVE | Noted: 2022-02-17

## 2022-02-17 ASSESSMENT — ENCOUNTER SYMPTOMS
FREQUENCY: 0
FATIGUE: 1
BRUISES/BLEEDS EASILY: 0
POLYDIPSIA: 0
COLOR CHANGE: 0
HEMATURIA: 0
COUGH: 0
FEVER: 0
SHORTNESS OF BREATH: 0
NUMBNESS: 0
FLANK PAIN: 0
WEAKNESS: 0
EYE DISCHARGE: 0
ABDOMINAL DISTENTION: 0
WOUND: 1
APPETITE CHANGE: 0
JOINT SWELLING: 0
RHINORRHEA: 0
EYE REDNESS: 0
NERVOUS/ANXIOUS: 0
CONFUSION: 0

## 2022-02-17 NOTE — ASSESSMENT & PLAN NOTE
PLAN:  Eucerin Original Healing Cream to dry skin of legs daily.     It is recommended that you clean the wound using running warm water, a mild bath soap, and a soft wash cloth or bath sponge. Proper cleansing of your wound is essential for wound healing to take place. Inadequate cleansing leads to the build up of yellow or green material in the wound bed, providing overgrowth of bacteria and noxious bacterial by products.PLAN:  Eucerin Original Healing Cream to dry skin of legs daily.     It is recommended that you clean the wound using running warm water, a mild bath soap, and a soft wash cloth or bath sponge. Proper cleansing of your wound is essential for wound healing to take place. Inadequate cleansing leads to the build up of yellow or green material in the wound bed, providing overgrowth of bacteria and noxious bacterial by products.

## 2022-02-17 NOTE — ASSESSMENT & PLAN NOTE
The patient is provided with a temporary pair of Tubigrip stockings    Rx: Knee-high gradient compression garments, 15/20 mmHg… Referred to [facility] for fitting and acquisition    PLAN:  Maintain leg elevation above level of the heart as much as possible.  Restrict fluid intake to < 66oz/24 hours  Restrict salt intake to no more than 3090-3131 mgs/sodium/day  Minimize prolonged sitting and standing  Remain active to point of physical tolerance

## 2022-02-17 NOTE — ASSESSMENT & PLAN NOTE
Plan:  Patient is asked to apply a nightly moisturizer to his legs.  He should wear some form of compression garment during waking hours.  I am pleased that he is sleeping in bed.  When he is sitting out of bed he should elevate his legs as much as possible.  He has limited ambulatory potential because of his cardiac and COPD issues.  I see no evidence of peripheral arterial disease.  I do not think he is a reasonable candidate for vein ablation.    The patient would be offered Unna boot compression therapy if he were to develop wounds or severe stasis dermatitis in the future.

## 2022-02-17 NOTE — PROGRESS NOTES
Patient ID: Rinku Maier                              : 1965  MRN: [id number]                                            Visit Date: 2022  Encounter Provider: INDERJIT Madrigal  Referring Provider: No ref. provider found    Subjective      HPI  Rinku Maier is a 56 y.o. old male with a chief compliant of Chronic Venous Insufficiency w/ Ulceration (Bilateral stasis dermatitis) and Leg Swelling   presenting today for evaluation and management of lower extremity edema with associated cellulitis.  Patient was referred to the Wound Center by Dr. Alexandre Fernando of cardiology.  Patient is undergone recent venous Doppler ultrasound of both lower extremities, that ruled out deep vein thrombosis.  He is noted to have stasis dermatitis and erythema in the legs.  His diuretic medications were recently adjusted where spironolactone was added and the patient was continued on furosemide 40 mg daily.    The following have been reviewed and updated as appropriate in this visit:   Tobacco  Allergies  Meds  Problems  Med Hx  Surg Hx  Fam Hx       Past Medical History:  has a past medical history of Abnormal ECG, COPD (chronic obstructive pulmonary disease) (CMS/[facility]), MUELLER (dyspnea on exertion), Hypertension, Peripheral edema, and Sleep apnea.    He has no past medical history of Alcoholism (CMS/[facility]) or Substance abuse (CMS/[facility]).  Past Surgical History:  has a past surgical history that includes Hernia repair.  Social History:  reports that he quit smoking about 2 years ago. He smoked 0.25 packs per day. He has never used smokeless tobacco. He reports current alcohol use. Drug use questions deferred to the physician.  Family History: family history includes Heart attack in his biological father; Stomach cancer in his biological mother and biological sister; Stroke in his biological father.  Medications:   Current Outpatient Medications:   •  albuterol HFA (VENTOLIN HFA) 90 mcg/actuation inhaler, inhale  ONE TO TWO PUFFS EVERY 6 HOURS AS NEEDED FOR WHEEZING, Disp: , Rfl: 0  •  aspirin 81 mg enteric coated tablet, TAKE ONE TABLET BY MOUTH DAILY, Disp: 90 tablet, Rfl: 3  •  furosemide 20 mg tablet, Take 2 tablets (40 mg total) by mouth once daily., Disp: 180 tablet, Rfl: 3  •  rosuvastatin (CRESTOR) 20 mg tablet, Take 1 tablet (20 mg total) by mouth daily., Disp: 90 tablet, Rfl: 3  •  spironolactone (ALDACTONE) 25 mg tablet, Take 1 tablet (25 mg total) by mouth daily., Disp: 90 tablet, Rfl: 1  •  umeclidinium-vilanterol (ANORO ELLIPTA) 62.5-25 mcg/actuation blister with device, Inhale 1 puff daily., Disp: , Rfl:   •  lisinopriL (PRINIVIL) 40 mg tablet, Take 1 tablet (40 mg total) by mouth daily., Disp: 90 tablet, Rfl: 3    Allergies: has No Known Allergies.     Review of Systems   Constitutional: Positive for fatigue. Negative for appetite change and fever.   HENT: Negative for congestion and rhinorrhea.    Eyes: Negative for discharge and redness.   Respiratory: Negative for cough and shortness of breath.    Cardiovascular: Positive for leg swelling.   Gastrointestinal: Negative for abdominal distention.   Endocrine: Negative for cold intolerance, heat intolerance and polydipsia.   Genitourinary: Negative for flank pain, frequency and hematuria.   Musculoskeletal: Negative for gait problem and joint swelling.   Skin: Positive for wound. Negative for color change.   Allergic/Immunologic: Negative for immunocompromised state.   Neurological: Negative for weakness and numbness.   Hematological: Does not bruise/bleed easily.   Psychiatric/Behavioral: Negative for confusion. The patient is not nervous/anxious.      Glucose, Serum   Date Value Ref Range Status   06/12/2019 102 (H) 65 - 99 mg/dL Final     Hemoglobin   Date Value Ref Range Status   06/12/2019 15.9 13.0 - 17.7 g/dL Final     Creatinine, Serum   Date Value Ref Range Status   06/12/2019 1.29 (H) 0.76 - 1.27 mg/dL Final   ]    Objective   Visit Vitals  Temp 36.3  °C (97.3 °F) (Temporal)       Physical Exam  Vitals and nursing note reviewed.   Constitutional:       General: He is not in acute distress.     Appearance: Normal appearance.   HENT:      Head: Normocephalic and atraumatic.   Eyes:      Conjunctiva/sclera: Conjunctivae normal.      Pupils: Pupils are equal, round, and reactive to light.   Neck:      Trachea: Trachea normal.   Cardiovascular:      Rate and Rhythm: Regular rhythm.   Pulmonary:      Effort: Pulmonary effort is normal. No respiratory distress.      Breath sounds: No wheezing.   Abdominal:      Palpations: Abdomen is soft.      Tenderness: There is no guarding.   Musculoskeletal:         General: Swelling present. No deformity. Normal range of motion.      Cervical back: Normal range of motion. No edema or erythema.      Right lower leg: Edema present.      Left lower leg: Edema present.        Legs:       Comments: Exam at this point time does not indicate the need for compression wraps.  The patient is offered conservative recommendations.  He is provided with a tube of Eucerin cream that can be purchased in a local pharmacy.  He is fitted for Tubigrip stockings, but it is requested that he make an appointment at [facility] pharmacy to have proper compression garments dispensed.  If at any point time open wounds, drainage or excessive redness or swelling are noted, the patient is asked to call the wound center immediately.   Skin:     General: Skin is warm.      Capillary Refill: Capillary refill takes less than 2 seconds.      Findings: Erythema present.   Neurological:      Mental Status: He is alert and oriented to person, place, and time. Mental status is at baseline.   Psychiatric:         Attention and Perception: He is attentive.         Mood and Affect: Affect is not inappropriate.         Speech: Speech normal.         Behavior: Behavior normal. Behavior is cooperative.                     During visit patient received cleansing with gauze and  saline to all wounds to assist in removing bioburden and loosely adhered slough    Assessment/Plan    Diagnosis Plan   1. Chronic venous stasis dermatitis of both lower extremities     2. Edema of both lower extremities     3. Chronic peripheral venous hypertension     4. Atrophic condition of skin       Problem List Items Addressed This Visit     Chronic peripheral venous hypertension     PLAN:  Patient has received comprehensive instructions and education regarding the management of chronic venous insufficiency.  He/she understands that lifestyle changes are essential in maintaining satisfactory outcomes.  These alterations in lifestyle, include: exercise, leg elevation, fluid restrictions, taking diuretics as prescribed, and wearing some form of compression garment.  Failure to comply will ultimately lead to worsening of chronic venous insufficiency and subsequent recurrence of cellulitis and ulcers.         Atrophic condition of skin     PLAN:  Eucerin Original Healing Cream to dry skin of legs daily.     It is recommended that you clean the wound using running warm water, a mild bath soap, and a soft wash cloth or bath sponge. Proper cleansing of your wound is essential for wound healing to take place. Inadequate cleansing leads to the build up of yellow or green material in the wound bed, providing overgrowth of bacteria and noxious bacterial by products.         Edema of both lower extremities     The patient is provided with a temporary pair of Tubigrip stockings    Rx: Knee-high gradient compression garments, 15/20 mmHg… Referred to [facility] pharmacy for fitting and acquisition    PLAN:  Maintain leg elevation above level of the heart as much as possible.  Restrict fluid intake to < 66oz/24 hours  Restrict salt intake to no more than 1876-6734 mgs/sodium/day  Minimize prolonged sitting and standing  Remain active to point of physical tolerance         Chronic venous stasis dermatitis of both lower  extremities - Primary     Plan:  Patient is asked to apply a nightly moisturizer to his legs.  He should wear some form of compression garment during waking hours.  I am pleased that he is sleeping in bed.  When he is sitting out of bed he should elevate his legs as much as possible.  He has limited ambulatory potential because of his cardiac and COPD issues.  I see no evidence of peripheral arterial disease.  I do not think he is a reasonable candidate for vein ablation.    The patient would be offered Unna boot compression therapy if he were to develop wounds or severe stasis dermatitis in the future.           This document was generated using speech recognition software. Please excuse any typographical errors.    Return to wound center in 4 weeks     INDERJIT Bueno. MD Rohan

## 2022-02-17 NOTE — ASSESSMENT & PLAN NOTE
PLAN:  Patient has received comprehensive instructions and education regarding the management of chronic venous insufficiency.  He/she understands that lifestyle changes are essential in maintaining satisfactory outcomes.  These alterations in lifestyle, include: exercise, leg elevation, fluid restrictions, taking diuretics as prescribed, and wearing some form of compression garment.  Failure to comply will ultimately lead to worsening of chronic venous insufficiency and subsequent recurrence of cellulitis and ulcers.

## 2022-02-18 ENCOUNTER — OFFICE VISIT (OUTPATIENT)
Dept: WOUND CARE | Facility: HOSPITAL | Age: 57
End: 2022-02-18
Attending: SURGERY
Payer: COMMERCIAL

## 2022-02-18 VITALS — TEMPERATURE: 97.3 F

## 2022-02-18 DIAGNOSIS — R60.0 EDEMA OF BOTH LOWER EXTREMITIES: ICD-10-CM

## 2022-02-18 DIAGNOSIS — L90.9 ATROPHIC CONDITION OF SKIN: ICD-10-CM

## 2022-02-18 DIAGNOSIS — I87.2 CHRONIC VENOUS STASIS DERMATITIS OF BOTH LOWER EXTREMITIES: Primary | ICD-10-CM

## 2022-02-18 DIAGNOSIS — I87.309 CHRONIC PERIPHERAL VENOUS HYPERTENSION: ICD-10-CM

## 2022-02-18 PROCEDURE — G0463 HOSPITAL OUTPT CLINIC VISIT: HCPCS

## 2022-02-18 PROCEDURE — 99204 OFFICE O/P NEW MOD 45 MIN: CPT | Performed by: SURGERY

## 2022-02-18 PROCEDURE — G0463 HOSPITAL OUTPT CLINIC VISIT: HCPCS | Performed by: SURGERY

## 2022-02-18 NOTE — LETTER
2022     Alexandre Fernando, DO  [address]  [facility]  [address]  [place] [address]    Patient: Rinku Maier  YOB: 1965  Date of Visit: 2022      Dear Dr. Fernando:    Thank you for referring Rinku Maier to me for evaluation. Below are my notes for this consultation.    If you have questions, please do not hesitate to call me. I look forward to following your patient along with you.         Sincerely,        INDERJIT Bueno. MD Rohan        CC: No Recipients  OSMIN Madrigal MD  2022 10:31 AM  Signed  Patient ID: Rinku Maier                              : 1965  MRN: [id number]                                            Visit Date: 2022  Encounter Provider: INDERJIT Madrigal  Referring Provider: No ref. provider found    Subjective      HPI  Rinku Maier is a 56 y.o. old male with a chief compliant of Chronic Venous Insufficiency w/ Ulceration (Bilateral stasis dermatitis) and Leg Swelling   presenting today for evaluation and management of lower extremity edema with associated cellulitis.  Patient was referred to the Wound [facility] by Dr. Alexandre Fernando of cardiology.  Patient is undergone recent venous Doppler ultrasound of both lower extremities, that ruled out deep vein thrombosis.  He is noted to have stasis dermatitis and erythema in the legs.  His diuretic medications were recently adjusted where spironolactone was added and the patient was continued on furosemide 40 mg daily.    The following have been reviewed and updated as appropriate in this visit:   Tobacco  Allergies  Meds  Problems  Med Hx  Surg Hx  Fam Hx       Past Medical History:  has a past medical history of Abnormal ECG, COPD (chronic obstructive pulmonary disease) (CMS/HCC), MUELLER (dyspnea on exertion), Hypertension, Peripheral edema, and Sleep apnea.    He has no past medical history of Alcoholism (CMS/HCC) or Substance abuse  (CMS/[facility]).  Past Surgical History:  has a past surgical history that includes Hernia repair.  Social History:  reports that he quit smoking about 2 years ago. He smoked 0.25 packs per day. He has never used smokeless tobacco. He reports current alcohol use. Drug use questions deferred to the physician.  Family History: family history includes Heart attack in his biological father; Stomach cancer in his biological mother and biological sister; Stroke in his biological father.  Medications:   Current Outpatient Medications:   •  albuterol HFA (VENTOLIN HFA) 90 mcg/actuation inhaler, inhale ONE TO TWO PUFFS EVERY 6 HOURS AS NEEDED FOR WHEEZING, Disp: , Rfl: 0  •  aspirin 81 mg enteric coated tablet, TAKE ONE TABLET BY MOUTH DAILY, Disp: 90 tablet, Rfl: 3  •  furosemide 20 mg tablet, Take 2 tablets (40 mg total) by mouth once daily., Disp: 180 tablet, Rfl: 3  •  rosuvastatin (CRESTOR) 20 mg tablet, Take 1 tablet (20 mg total) by mouth daily., Disp: 90 tablet, Rfl: 3  •  spironolactone (ALDACTONE) 25 mg tablet, Take 1 tablet (25 mg total) by mouth daily., Disp: 90 tablet, Rfl: 1  •  umeclidinium-vilanterol (ANORO ELLIPTA) 62.5-25 mcg/actuation blister with device, Inhale 1 puff daily., Disp: , Rfl:   •  lisinopriL (PRINIVIL) 40 mg tablet, Take 1 tablet (40 mg total) by mouth daily., Disp: 90 tablet, Rfl: 3    Allergies: has No Known Allergies.     Review of Systems   Constitutional: Positive for fatigue. Negative for appetite change and fever.   HENT: Negative for congestion and rhinorrhea.    Eyes: Negative for discharge and redness.   Respiratory: Negative for cough and shortness of breath.    Cardiovascular: Positive for leg swelling.   Gastrointestinal: Negative for abdominal distention.   Endocrine: Negative for cold intolerance, heat intolerance and polydipsia.   Genitourinary: Negative for flank pain, frequency and hematuria.   Musculoskeletal: Negative for gait problem and joint swelling.   Skin: Positive for  wound. Negative for color change.   Allergic/Immunologic: Negative for immunocompromised state.   Neurological: Negative for weakness and numbness.   Hematological: Does not bruise/bleed easily.   Psychiatric/Behavioral: Negative for confusion. The patient is not nervous/anxious.      Glucose, Serum   Date Value Ref Range Status   06/12/2019 102 (H) 65 - 99 mg/dL Final     Hemoglobin   Date Value Ref Range Status   06/12/2019 15.9 13.0 - 17.7 g/dL Final     Creatinine, Serum   Date Value Ref Range Status   06/12/2019 1.29 (H) 0.76 - 1.27 mg/dL Final   ]    Objective   Visit Vitals  Temp 36.3 °C (97.3 °F) (Temporal)       Physical Exam  Vitals and nursing note reviewed.   Constitutional:       General: He is not in acute distress.     Appearance: Normal appearance.   HENT:      Head: Normocephalic and atraumatic.   Eyes:      Conjunctiva/sclera: Conjunctivae normal.      Pupils: Pupils are equal, round, and reactive to light.   Neck:      Trachea: Trachea normal.   Cardiovascular:      Rate and Rhythm: Regular rhythm.   Pulmonary:      Effort: Pulmonary effort is normal. No respiratory distress.      Breath sounds: No wheezing.   Abdominal:      Palpations: Abdomen is soft.      Tenderness: There is no guarding.   Musculoskeletal:         General: Swelling present. No deformity. Normal range of motion.      Cervical back: Normal range of motion. No edema or erythema.      Right lower leg: Edema present.      Left lower leg: Edema present.        Legs:       Comments: Exam at this point time does not indicate the need for compression wraps.  The patient is offered conservative recommendations.  He is provided with a tube of Eucerin cream that can be purchased in a local pharmacy.  He is fitted for Tubigrip stockings, but it is requested that he make an appointment at [facility] pharmacy to have proper compression garments dispensed.  If at any point time open wounds, drainage or excessive redness or swelling are noted,  the patient is asked to call the wound center immediately.   Skin:     General: Skin is warm.      Capillary Refill: Capillary refill takes less than 2 seconds.      Findings: Erythema present.   Neurological:      Mental Status: He is alert and oriented to person, place, and time. Mental status is at baseline.   Psychiatric:         Attention and Perception: He is attentive.         Mood and Affect: Affect is not inappropriate.         Speech: Speech normal.         Behavior: Behavior normal. Behavior is cooperative.                     During visit patient received cleansing with gauze and saline to all wounds to assist in removing bioburden and loosely adhered slough    Assessment/Plan    Diagnosis Plan   1. Chronic venous stasis dermatitis of both lower extremities     2. Edema of both lower extremities     3. Chronic peripheral venous hypertension     4. Atrophic condition of skin       Problem List Items Addressed This Visit     Chronic peripheral venous hypertension     PLAN:  Patient has received comprehensive instructions and education regarding the management of chronic venous insufficiency.  He/she understands that lifestyle changes are essential in maintaining satisfactory outcomes.  These alterations in lifestyle, include: exercise, leg elevation, fluid restrictions, taking diuretics as prescribed, and wearing some form of compression garment.  Failure to comply will ultimately lead to worsening of chronic venous insufficiency and subsequent recurrence of cellulitis and ulcers.         Atrophic condition of skin     PLAN:  Eucerin Original Healing Cream to dry skin of legs daily.     It is recommended that you clean the wound using running warm water, a mild bath soap, and a soft wash cloth or bath sponge. Proper cleansing of your wound is essential for wound healing to take place. Inadequate cleansing leads to the build up of yellow or green material in the wound bed, providing overgrowth of bacteria  and noxious bacterial by products.         Edema of both lower extremities     The patient is provided with a temporary pair of Tubigrip stockings    Rx: Knee-high gradient compression garments, 15/20 mmHg… Referred to [facility] pharmacy for fitting and acquisition    PLAN:  Maintain leg elevation above level of the heart as much as possible.  Restrict fluid intake to < 66oz/24 hours  Restrict salt intake to no more than 7858-5548 mgs/sodium/day  Minimize prolonged sitting and standing  Remain active to point of physical tolerance         Chronic venous stasis dermatitis of both lower extremities - Primary     Plan:  Patient is asked to apply a nightly moisturizer to his legs.  He should wear some form of compression garment during waking hours.  I am pleased that he is sleeping in bed.  When he is sitting out of bed he should elevate his legs as much as possible.  He has limited ambulatory potential because of his cardiac and COPD issues.  I see no evidence of peripheral arterial disease.  I do not think he is a reasonable candidate for vein ablation.    The patient would be offered Unna boot compression therapy if he were to develop wounds or severe stasis dermatitis in the future.           This document was generated using speech recognition software. Please excuse any typographical errors.    Return to wound center in 4 weeks     INDERJIT Bueno. MD Rohan

## 2022-02-18 NOTE — PATIENT INSTRUCTIONS
Wound Healing Center Instructions    MEDICATIONS     Medication Note  Continue present medications as prescribed by the Wound Healing Center or other physicians you see. To avoid any problems keep the Wound Healing Center informed each visit of any medications changes that occur.     WOUND CARE     Clean Wound with: Soap and Water and Showering   Treatment 1   Location: left & right legs  Dressing: MOISTURIZE legs daily.  Wear compression on legs daily.  You were given a prescription for compression stockings at [facility].  You were given tubigrip stockings today to compress legs until you are able to get your compression stockings  Dressing Care Frequency: Daily  Care Provider: Self       Basic Principles      • Wash your hands thoroughly with soap and water and after each dressing change. If someone other than the patient changes the dressing, it’s best to wear disposable gloves.   • Do not get the wound or dressing wet.   • To shower: remove the dressing, shower with soap and water (including washing the wound - do not use a washcloth), air dry, then redress the wound.  • Do not take a tub bath  • Keep all your dressings in a clean, covered container at home to avoid dust and contamination.  • Discard used dressings in a plastic bag or covered trash container.  • Check your wound and the surrounding skin at each dressing change for redness, warmth, swelling, increased pain, foul odor, fever, pus or abnormal drainage or discharge.  • Notify Wound Healing Center if any of these changes occur - [phone].        Nutrition  • Eat a well, balanced diet with adequate protein to support wound healing.   • Take a multivitamin every day. Adequate nutrition supports healing and new tissue growth.  • All diabetic patients should strive to keep blood sugars within a normal, practical range.   • Elevated blood sugars can delay your wound healing.        ACTIVITY     Elevate legs above level of heart   Normal activity  then rest and elevation  May shower  May excercise  May walk    MOBILITY     independent

## 2022-03-17 RX ORDER — ASPIRIN 81 MG/1
TABLET ORAL
Qty: 90 TABLET | Refills: 3 | Status: SHIPPED | OUTPATIENT
Start: 2022-03-17

## 2022-05-17 PROBLEM — L30.9 DERMATITIS, UNSPECIFIED: Status: ACTIVE | Noted: 2022-01-01

## 2022-05-17 NOTE — PROCEDURE: PRESCRIPTION MEDICATION MANAGEMENT
Initiate Treatment: Hibiclens 4% topical liquid: Wash legs QDAY\\ntriamcinolone acetonide 0.1% topical cream: Apply a thin layer to affected area of lower legs QDAY\\nSilvadene 1% topical cream: Apply a thin layer to the open sores bid
Render In Strict Bullet Format?: No
Detail Level: Simple

## 2022-06-07 PROBLEM — I87.2 VENOUS INSUFFICIENCY (CHRONIC) (PERIPHERAL): Status: ACTIVE | Noted: 2022-01-01

## 2022-06-07 NOTE — PROCEDURE: PRESCRIPTION MEDICATION MANAGEMENT
Initiate Treatment: urea 25% lotion: Apply QDAY after shower
Modify Regimen: Silvadene 1% topical cream: Apply a thin layer to the open sores bid prn
Discontinue Regimen: Hibiclens 4% topical liquid: Wash legs QDAY (pt never got)\\ntriamcinolone acetonide 0.1% topical cream: Apply a thin layer to affected area of lower legs QDAY
Render In Strict Bullet Format?: No
Detail Level: Simple

## 2022-06-07 NOTE — PROCEDURE: ADDITIONAL NOTES
Detail Level: Zone
Render Risk Assessment In Note?: yes
Additional Notes: Patient not at treatment goal

## 2022-08-22 ENCOUNTER — TELEPHONE (OUTPATIENT)
Dept: SCHEDULING | Facility: CLINIC | Age: 57
End: 2022-08-22
Payer: COMMERCIAL

## 2022-08-22 NOTE — TELEPHONE ENCOUNTER
Patient Was admitted to [facility] 08/18/22 - 08/20/22.    Patient is seeking a HFU. There is nothing available in Epic until mid October.    Patient can be reached [phone]

## 2022-08-25 ENCOUNTER — OFFICE VISIT (OUTPATIENT)
Dept: CARDIOLOGY | Facility: CLINIC | Age: 57
End: 2022-08-25
Payer: COMMERCIAL

## 2022-08-25 VITALS
WEIGHT: 315 LBS | SYSTOLIC BLOOD PRESSURE: 122 MMHG | DIASTOLIC BLOOD PRESSURE: 74 MMHG | BODY MASS INDEX: 49.5 KG/M2 | HEART RATE: 86 BPM

## 2022-08-25 DIAGNOSIS — I25.10 CORONARY ARTERY DISEASE INVOLVING NATIVE CORONARY ARTERY OF NATIVE HEART WITHOUT ANGINA PECTORIS: ICD-10-CM

## 2022-08-25 DIAGNOSIS — R06.02 SHORTNESS OF BREATH: Primary | ICD-10-CM

## 2022-08-25 DIAGNOSIS — I47.10 SVT (SUPRAVENTRICULAR TACHYCARDIA) (CMS/HCC): ICD-10-CM

## 2022-08-25 DIAGNOSIS — R60.0 EDEMA OF BOTH LOWER EXTREMITIES: ICD-10-CM

## 2022-08-25 PROCEDURE — 93000 ELECTROCARDIOGRAM COMPLETE: CPT | Performed by: NURSE PRACTITIONER

## 2022-08-25 PROCEDURE — 3008F BODY MASS INDEX DOCD: CPT | Performed by: NURSE PRACTITIONER

## 2022-08-25 PROCEDURE — 99214 OFFICE O/P EST MOD 30 MIN: CPT | Performed by: NURSE PRACTITIONER

## 2022-08-25 RX ORDER — METOPROLOL SUCCINATE 50 MG/1
50 TABLET, EXTENDED RELEASE ORAL DAILY
COMMUNITY
Start: 2022-08-21

## 2022-08-25 ASSESSMENT — ENCOUNTER SYMPTOMS
DYSURIA: 0
NEAR-SYNCOPE: 0
SNORING: 0
WHEEZING: 0
SHORTNESS OF BREATH: 0
PALPITATIONS: 0
HEARTBURN: 0
CLAUDICATION: 0
SYNCOPE: 0
NIGHT SWEATS: 0
ORTHOPNEA: 0
BLURRED VISION: 0
DYSPNEA ON EXERTION: 0
NERVOUS/ANXIOUS: 0
COUGH: 0
MYALGIAS: 0
DIZZINESS: 0
IRREGULAR HEARTBEAT: 0
HEADACHES: 0
WEIGHT GAIN: 0
DISTURBANCES IN COORDINATION: 0
NAUSEA: 0
ANOREXIA: 0
WEAKNESS: 0
HEMATURIA: 0
ABDOMINAL PAIN: 0
PND: 0

## 2022-08-25 NOTE — PROGRESS NOTES
HPI  Rinku Maier presents to the office today for a hospital follow-up.  He presented to the emergency room at [facility] on 8/18/2022 with complaints of pain and swelling in his bilateral lower extremities.  In the emergency room, the patient was found to be in SVT with a rate in 160 - 170's.  He was asymptomatic.  He received 2 doses of Lopressor 25 mg without any results.  He then received adenosine 6 mg which converted him to sinus rhythm. Cardiology was consulted and an echocardiogram revealed severe concentric LVH with normal systolic function his EF was 55 to 60%.  LV diastolic function was indeterminate.  The right ventricle was normal in size and function.  Right and left atrial size was normal.  There was no aortic stenosis, trace MR.  He was started on metoprolol succinate 50 mg daily and on discharge was given a prescription for metolazone 5 mg to take twice a week in addition to his lasix 40 mg daily.  Podiatry was also consulted because of the lower extremity swelling and superficial wounds on his legs.  He had initially been treated with antibiotics due to concerns for cellulitis, however this was stopped because it was felt that it was more venous stasis changes than a cellulitis.  There is no surgical intervention.   He had ABIs done that revealed mildly abnormal index on the left of 0.88; the right was 0.95.    He had bilateral venous Dopplers that were negative for DVT.  Today, Rinku tells me he is doing much better.  His weight is down about 15 lbs from diuresis.  It appear, however, he was taking the metolazone every day and not twice a week as directed.  He denies complaints of chest pain, SOB or palpitations.  He sleeps on his couch but keeps his legs elevated.  He denies PND.  His edema has improved.       Medical History: has a past medical history of Abnormal ECG, COPD (chronic obstructive pulmonary disease) (CMS/[facility]), MUELLER (dyspnea on exertion), Hypertension,  Peripheral edema, and Sleep apnea.    He has no past medical history of Alcoholism (CMS/[facility]) or Substance abuse (CMS/[facility]).    Surgical History: has a past surgical history that includes Hernia repair.    Social History:  Social History     Tobacco Use   • Smoking status: Former Smoker     Packs/day: 0.25     Quit date: 5/10/2019     Years since quitting: 3.2   • Smokeless tobacco: Never Used   • Tobacco comment: former 2 packs a day smoker, currently taking chantix   Substance Use Topics   • Alcohol use: Yes     Comment: occasionally   • Drug use: Defer       Family History: He indicated that his biological mother is . He indicated that his biological father is . He indicated that his biological sister is .           Allergies:Patient has no known allergies.    Current Medications:  Current Outpatient Medications:   •  metoprolol succinate XL (TOPROL-XL) 50 mg 24 hr tablet, Take 50 mg by mouth daily., Disp: , Rfl:   •  albuterol HFA (VENTOLIN HFA) 90 mcg/actuation inhaler, inhale ONE TO TWO PUFFS EVERY 6 HOURS AS NEEDED FOR WHEEZING, Disp: , Rfl: 0  •  aspirin 81 mg enteric coated tablet, TAKE ONE TABLET BY MOUTH DAILY, Disp: 90 tablet, Rfl: 3  •  furosemide 20 mg tablet, Take 2 tablets (40 mg total) by mouth once daily., Disp: 180 tablet, Rfl: 3  •  lisinopriL (PRINIVIL) 40 mg tablet, Take 1 tablet (40 mg total) by mouth once daily., Disp: 90 tablet, Rfl: 3  •  rosuvastatin (CRESTOR) 20 mg tablet, Take 1 tablet (20 mg total) by mouth daily., Disp: 90 tablet, Rfl: 3  •  spironolactone (ALDACTONE) 25 mg tablet, Take 1 tablet (25 mg total) by mouth once daily., Disp: 90 tablet, Rfl: 1  •  umeclidinium-vilanterol (ANORO ELLIPTA) 62.5-25 mcg/actuation blister with device, Inhale 1 puff daily., Disp: , Rfl:          Review of Systems   Constitutional: Negative for malaise/fatigue, night sweats and weight gain.   HENT: Negative for nosebleeds.    Eyes: Negative for blurred vision.   Cardiovascular:  Positive for leg swelling. Negative for chest pain, claudication, cyanosis, dyspnea on exertion, irregular heartbeat, near-syncope, orthopnea, palpitations, paroxysmal nocturnal dyspnea and syncope.   Respiratory: Negative for cough, shortness of breath, snoring and wheezing.    Skin: Negative for rash.   Musculoskeletal: Positive for arthritis and joint pain (knees). Negative for myalgias.   Gastrointestinal: Negative for abdominal pain, anorexia, dysphagia, heartburn and nausea.   Genitourinary: Negative for dysuria, hematuria and nocturia.   Neurological: Negative for disturbances in coordination, dizziness, headaches and weakness.   Psychiatric/Behavioral: The patient is not nervous/anxious.    Allergic/Immunologic: Negative for HIV exposure.     Vitals:    08/25/22 1257 08/25/22 1609   BP: 118/72 122/74   BP Location: Right upper arm Left upper arm   Patient Position: Sitting Sitting   Pulse: 86    Weight: (!) 156 kg (345 lb)      Physical Exam  Constitutional:       General: He is not in acute distress.     Appearance: He is obese. He is not ill-appearing or diaphoretic.   HENT:      Head: Normocephalic and atraumatic.   Eyes:      Conjunctiva/sclera: Conjunctivae normal.   Cardiovascular:      Rate and Rhythm: Normal rate and regular rhythm.      Heart sounds:     Gallop (S4) present.   Pulmonary:      Effort: Pulmonary effort is normal. No respiratory distress.      Breath sounds: No wheezing, rhonchi or rales.   Abdominal:      Palpations: Abdomen is soft.      Comments: Abdominal obesity   Musculoskeletal:         General: Normal range of motion.      Cervical back: Normal range of motion and neck supple.      Right lower leg: Edema present.      Left lower leg: Edema present.   Skin:     General: Skin is warm and dry.      Comments: Chronic venous stasis changes with healed over, superficial ulcers.  No drainage.   Neurological:      General: No focal deficit present.      Mental Status: He is alert and  oriented to person, place, and time.   Psychiatric:         Mood and Affect: Mood normal.         Behavior: Behavior normal.         Thought Content: Thought content normal.         Judgment: Judgment normal.                ECG:  NSR, IRBBB.  Rate 88; QTc. 405 msecs.    Coronary artery disease involving native coronary artery of native heart without angina pectoris  Rinku denies anginal symptoms and his ECG is unchanged in the office today.  He had an echocardiogram during his admission which revealed normal LV function with an LVEF of 55-60%.  He will continue his aspirin, plavix, lisinopril 40 mg daiy, metoprolol succinate 50 mg daily and rosuvastatin.    Edema of both lower extremities  He tells me his edema is improved and his weight is down 15-20 lbs.  He is currently taking lasix 40 mg daily, spironolactone 25 mg daily and has been taking metolazone every day.  I asked him to hold the metolazone for now and gave him a prescription for a BMP.  He has not been taking it properly and may benefit from changing his lasix to torsemide.  I'll call him with his lab results.    SVT (supraventricular tachycardia) (CMS/HCC)  Rinku had a AVNRT on arrival at the ER, however, was asymptomatic.  It was terminated with adenosine 6 mg.  He is now on metoprolol succinate 50 mg daily.           Rinku will get lab work done and will return to the office in 3 weeks for a follow-up.  He is aware to contact us with any problems.    I spent 30 minutes on this date of service performing the following activities: obtaining history, performing examination, entering orders, documenting, obtaining / reviewing records and communicating results.    RAINE Limon   8/25/2022  4:41 PM

## 2022-08-25 NOTE — ASSESSMENT & PLAN NOTE
Rinku had a AVNRT on arrival at the ER, however, was asymptomatic.  It was terminated with adenosine 6 mg.  He is now on metoprolol succinate 50 mg daily.

## 2022-08-25 NOTE — ASSESSMENT & PLAN NOTE
Rinku denies anginal symptoms and his ECG is unchanged in the office today.  He had an echocardiogram during his admission which revealed normal LV function with an LVEF of 55-60%.  He will continue his aspirin, plavix, lisinopril 40 mg daiy, metoprolol succinate 50 mg daily and rosuvastatin.

## 2022-08-25 NOTE — ASSESSMENT & PLAN NOTE
He tells me his edema is improved and his weight is down 15-20 lbs.  He is currently taking lasix 40 mg daily, spironolactone 25 mg daily and has been taking metolazone every day.  I asked him to hold the metolazone for now and gave him a prescription for a BMP.  He has not been taking it properly and may benefit from changing his lasix to torsemide.  I'll call him with his lab results.

## 2022-09-15 ENCOUNTER — OFFICE VISIT (OUTPATIENT)
Dept: CARDIOLOGY | Facility: CLINIC | Age: 57
End: 2022-09-15
Payer: COMMERCIAL

## 2022-09-15 VITALS
HEART RATE: 86 BPM | DIASTOLIC BLOOD PRESSURE: 66 MMHG | BODY MASS INDEX: 50.31 KG/M2 | SYSTOLIC BLOOD PRESSURE: 126 MMHG | WEIGHT: 315 LBS | OXYGEN SATURATION: 98 %

## 2022-09-15 DIAGNOSIS — I25.10 CORONARY ARTERY DISEASE INVOLVING NATIVE CORONARY ARTERY OF NATIVE HEART WITHOUT ANGINA PECTORIS: Primary | ICD-10-CM

## 2022-09-15 PROCEDURE — 99214 OFFICE O/P EST MOD 30 MIN: CPT | Performed by: NURSE PRACTITIONER

## 2022-09-15 PROCEDURE — 3008F BODY MASS INDEX DOCD: CPT | Performed by: NURSE PRACTITIONER

## 2022-09-15 PROCEDURE — 93000 ELECTROCARDIOGRAM COMPLETE: CPT | Performed by: NURSE PRACTITIONER

## 2022-09-15 RX ORDER — FUROSEMIDE 20 MG/1
60 TABLET ORAL
Qty: 180 TABLET | Refills: 3 | COMMUNITY
Start: 2022-09-15 | End: 2022-10-13 | Stop reason: SDUPTHER

## 2022-09-15 ASSESSMENT — ENCOUNTER SYMPTOMS
NAUSEA: 0
HEARTBURN: 0
BLURRED VISION: 0
ORTHOPNEA: 0
CLAUDICATION: 0
COUGH: 0
DYSPNEA ON EXERTION: 0
WEAKNESS: 0
PALPITATIONS: 0
HEMATURIA: 0
NIGHT SWEATS: 0
WHEEZING: 0
ABDOMINAL PAIN: 0
NEAR-SYNCOPE: 0
HEADACHES: 0
DYSURIA: 0
PND: 0
MYALGIAS: 0
NERVOUS/ANXIOUS: 0
DIZZINESS: 0
SYNCOPE: 0
ANOREXIA: 0
DISTURBANCES IN COORDINATION: 0
WEIGHT GAIN: 1
SNORING: 0
SHORTNESS OF BREATH: 0
IRREGULAR HEARTBEAT: 0

## 2022-09-15 NOTE — ASSESSMENT & PLAN NOTE
Rinku denies anginal symptoms and his ECG is unchanged in the office today.  His echocardiogram revealed severe concentric LVH with normal systolic function and an LVEF of 55-60%.  He will continue lisinopril 40 mg daily, metoprolol succinate 50 mg daily, his lasix will be increased to 60 mg daily and he will continue spironolactone.  Jardiance will also be added and he was given samples and instructed to take one a day.  Rinku was also instructed to get his lab work done so we can assess his kidney function and electrolytes.

## 2022-09-15 NOTE — PROGRESS NOTES
HPI  Rinku Maier returns to the office today for a follow-up on his last visit 3 weeks ago.  He had been in [facility] with significant LE edema and SVT.  He was discharged on lasix 40 mg daily and metolazone twice a week.  Rinku was taking his metolazone every day and did have a 15 lb weight loss from the diuretics.  He was given a prescription to get lab work done but forgot to do this.  He reports compliance with his medications and is now taking lasix 40 mg daily.  He has gained 5 lbs since his last visit and his legs are more edematous.  He denies complaints of chest pain, SOB or palpitations.  He sleeps in an upright position on his sofa.    He tells me he doesn't sleep well because he has WILLARD and his BIPAP was recalled.  He is waiting for his new machine.         Medical History: has a past medical history of Abnormal ECG, COPD (chronic obstructive pulmonary disease) (CMS/[facility]), MUELLER (dyspnea on exertion), Hypertension, Peripheral edema, and Sleep apnea.    He has no past medical history of Alcoholism (CMS/[facility]) or Substance abuse (CMS/[facility]).    Surgical History: has a past surgical history that includes Hernia repair.    Social History:  Social History     Tobacco Use    Smoking status: Former Smoker     Packs/day: 0.25     Quit date: 5/10/2019     Years since quitting: 3.3    Smokeless tobacco: Never Used    Tobacco comment: former 2 packs a day smoker, currently taking chantix   Substance Use Topics    Alcohol use: Yes     Comment: occasionally    Drug use: Defer       Family History: He indicated that his biological mother is . He indicated that his biological father is . He indicated that his biological sister is .           Allergies:Patient has no known allergies.    Current Medications:  Current Outpatient Medications:     empagliflozin (JARDIANCE) 10 mg tablet, Take 1 tablet (10 mg total) by mouth daily., Disp: 90 tablet, Rfl: 1    furosemide (LASIX) 20 mg tablet, Take 3  tablets (60 mg total) by mouth once daily., Disp: 180 tablet, Rfl: 3    albuterol HFA (VENTOLIN HFA) 90 mcg/actuation inhaler, inhale ONE TO TWO PUFFS EVERY 6 HOURS AS NEEDED FOR WHEEZING, Disp: , Rfl: 0    aspirin 81 mg enteric coated tablet, TAKE ONE TABLET BY MOUTH DAILY, Disp: 90 tablet, Rfl: 3    lisinopriL (PRINIVIL) 40 mg tablet, Take 1 tablet (40 mg total) by mouth once daily., Disp: 90 tablet, Rfl: 3    metoprolol succinate XL (TOPROL-XL) 50 mg 24 hr tablet, Take 50 mg by mouth daily., Disp: , Rfl:     rosuvastatin (CRESTOR) 20 mg tablet, Take 1 tablet (20 mg total) by mouth daily., Disp: 90 tablet, Rfl: 3    spironolactone (ALDACTONE) 25 mg tablet, Take 1 tablet (25 mg total) by mouth once daily., Disp: 90 tablet, Rfl: 1    umeclidinium-vilanterol (ANORO ELLIPTA) 62.5-25 mcg/actuation blister with device, Inhale 1 puff daily., Disp: , Rfl:          Review of Systems   Constitutional: Positive for weight gain. Negative for malaise/fatigue and night sweats.   HENT: Negative for nosebleeds.    Eyes: Negative for blurred vision.   Cardiovascular: Positive for leg swelling. Negative for chest pain, claudication, cyanosis, dyspnea on exertion, irregular heartbeat, near-syncope, orthopnea, palpitations, paroxysmal nocturnal dyspnea and syncope.   Respiratory: Negative for cough, shortness of breath, snoring and wheezing.    Skin: Negative for rash.   Musculoskeletal: Positive for arthritis and joint pain. Negative for myalgias.   Gastrointestinal: Negative for abdominal pain, anorexia, dysphagia, heartburn and nausea.   Genitourinary: Negative for dysuria, hematuria and nocturia.   Neurological: Negative for disturbances in coordination, dizziness, headaches and weakness.   Psychiatric/Behavioral: The patient is not nervous/anxious.    Allergic/Immunologic: Negative for HIV exposure.     Vitals:    09/15/22 1553 09/15/22 1555   BP: 122/68 126/66   BP Location: Right upper arm Left upper arm   Patient  Position: Sitting Sitting   Pulse: 86    SpO2: 98%    Weight: (!) 159 kg (350 lb 9.6 oz)      Physical Exam  Constitutional:       General: He is not in acute distress.     Appearance: Normal appearance. He is obese. He is not ill-appearing or diaphoretic.   HENT:      Head: Normocephalic and atraumatic.   Eyes:      Conjunctiva/sclera: Conjunctivae normal.   Cardiovascular:      Rate and Rhythm: Normal rate and regular rhythm.      Heart sounds:     Gallop (S4) present.   Pulmonary:      Effort: Pulmonary effort is normal. No respiratory distress.      Breath sounds: No wheezing, rhonchi or rales.   Abdominal:      Comments: Abdominal obesity   Musculoskeletal:         General: Normal range of motion.      Cervical back: Normal range of motion.      Right lower leg: Edema present.      Left lower leg: Edema (+2 edema) present.   Skin:     General: Skin is warm and dry.      Comments: Chronic venous stasis changes with healed superficial ulcers.   Neurological:      General: No focal deficit present.      Mental Status: He is alert and oriented to person, place, and time.   Psychiatric:         Mood and Affect: Mood normal.         Behavior: Behavior normal.         Thought Content: Thought content normal.         Judgment: Judgment normal.                ECG:  NSR, IRBBB, Rate 86, QTc. 397 msecs.    Coronary artery disease involving native coronary artery of native heart without angina pectoris  Rinku denies anginal symptoms and his ECG is unchanged in the office today.  His echocardiogram revealed severe concentric LVH with normal systolic function and an LVEF of 55-60%.  He will continue lisinopril 40 mg daily, metoprolol succinate 50 mg daily, his lasix will be increased to 60 mg daily and he will continue spironolactone.  Jardiance will also be added and he was given samples and instructed to take one a day.  Rinku was also instructed to get his lab work done so we can assess his kidney function and  drew Dobbs will be contacted with his lab results and will follow-up in the office with Dr. Fernando in a month.    I spent 30 minutes on this date of service performing the following activities: obtaining history, performing examination, entering orders, documenting, obtaining / reviewing records and providing counseling and education.    RAINE Limon   9/15/2022  4:13 PM

## 2022-10-13 RX ORDER — FUROSEMIDE 20 MG/1
60 TABLET ORAL
Qty: 270 TABLET | Refills: 3 | Status: SHIPPED | OUTPATIENT
Start: 2022-10-13 | End: 2022-12-08

## 2022-10-13 RX ORDER — ROSUVASTATIN CALCIUM 20 MG/1
20 TABLET, COATED ORAL DAILY
Qty: 90 TABLET | Refills: 3 | Status: SHIPPED | OUTPATIENT
Start: 2022-10-13

## 2022-10-13 NOTE — TELEPHONE ENCOUNTER
Medicine Refill Request    Last Office: Visit date not found   Last Consult Visit: Visit date not found  Last Telemedicine Visit: Visit date not found    Pt req refills on lasix and crestor      Current Outpatient Medications:     albuterol HFA (VENTOLIN HFA) 90 mcg/actuation inhaler, inhale ONE TO TWO PUFFS EVERY 6 HOURS AS NEEDED FOR WHEEZING, Disp: , Rfl: 0    aspirin 81 mg enteric coated tablet, TAKE ONE TABLET BY MOUTH DAILY, Disp: 90 tablet, Rfl: 3    empagliflozin (JARDIANCE) 10 mg tablet, Take 1 tablet (10 mg total) by mouth daily., Disp: 90 tablet, Rfl: 1    furosemide (LASIX) 20 mg tablet, Take 3 tablets (60 mg total) by mouth once daily., Disp: 180 tablet, Rfl: 3    lisinopriL (PRINIVIL) 40 mg tablet, Take 1 tablet (40 mg total) by mouth once daily., Disp: 90 tablet, Rfl: 3    metoprolol succinate XL (TOPROL-XL) 50 mg 24 hr tablet, Take 50 mg by mouth daily., Disp: , Rfl:     rosuvastatin (CRESTOR) 20 mg tablet, Take 1 tablet (20 mg total) by mouth daily., Disp: 90 tablet, Rfl: 3    spironolactone (ALDACTONE) 25 mg tablet, Take 1 tablet (25 mg total) by mouth once daily., Disp: 90 tablet, Rfl: 1    umeclidinium-vilanterol (ANORO ELLIPTA) 62.5-25 mcg/actuation blister with device, Inhale 1 puff daily., Disp: , Rfl:       BP Readings from Last 3 Encounters:   09/15/22 126/66   08/25/22 122/74   02/10/22 118/76       Recent Lab results:  No results found for: CHOL, No results found for: HDL, No results found for: LDLCALC, No results found for: TRIG     Lab Results   Component Value Date    GLUCOSE 102 (H) 06/12/2019   , No results found for: HGBA1C      Lab Results   Component Value Date    CREATININE 1.29 (H) 06/12/2019       No results found for: TSH

## 2022-10-27 ENCOUNTER — OFFICE VISIT (OUTPATIENT)
Dept: CARDIOLOGY | Facility: CLINIC | Age: 57
End: 2022-10-27
Payer: COMMERCIAL

## 2022-10-27 VITALS — BODY MASS INDEX: 44.1 KG/M2 | WEIGHT: 315 LBS | HEIGHT: 71 IN | RESPIRATION RATE: 20 BRPM | HEART RATE: 95 BPM

## 2022-10-27 DIAGNOSIS — I25.10 CORONARY ARTERY DISEASE INVOLVING NATIVE CORONARY ARTERY OF NATIVE HEART WITHOUT ANGINA PECTORIS: Primary | ICD-10-CM

## 2022-10-27 PROCEDURE — 3008F BODY MASS INDEX DOCD: CPT | Performed by: INTERNAL MEDICINE

## 2022-10-27 PROCEDURE — 99214 OFFICE O/P EST MOD 30 MIN: CPT | Performed by: INTERNAL MEDICINE

## 2022-10-27 PROCEDURE — 93000 ELECTROCARDIOGRAM COMPLETE: CPT | Performed by: INTERNAL MEDICINE

## 2022-10-27 RX ORDER — SPIRONOLACTONE 25 MG/1
25 TABLET ORAL
Qty: 90 TABLET | Refills: 3 | Status: SHIPPED | OUTPATIENT
Start: 2022-10-27

## 2022-10-27 RX ORDER — DULOXETIN HYDROCHLORIDE 60 MG/1
1 CAPSULE, DELAYED RELEASE ORAL NIGHTLY
COMMUNITY
Start: 2022-10-26

## 2022-10-27 RX ORDER — CEPHALEXIN 500 MG/1
500 CAPSULE ORAL 4 TIMES DAILY
Qty: 28 CAPSULE | Refills: 0 | Status: SHIPPED | OUTPATIENT
Start: 2022-10-27 | End: 2022-11-03

## 2022-10-27 ASSESSMENT — ENCOUNTER SYMPTOMS
POOR WOUND HEALING: 1
PSYCHIATRIC NEGATIVE: 1
MYALGIAS: 1
WEIGHT GAIN: 1
NEUROLOGICAL NEGATIVE: 1
DYSPNEA ON EXERTION: 1
EYES NEGATIVE: 1
SHORTNESS OF BREATH: 1
GASTROINTESTINAL NEGATIVE: 1
COLOR CHANGE: 1
HEMATOLOGIC/LYMPHATIC NEGATIVE: 1
ENDOCRINE NEGATIVE: 1
ALLERGIC/IMMUNOLOGIC NEGATIVE: 1

## 2022-10-27 NOTE — LETTER
October 27, 2022     Hyacinth White  [address] [address]    Patient: Rinku Maier  YOB: 1965  Date of Visit: 10/27/2022      Dear Dr. White:    Thank you for referring Rinku Maier to me for evaluation. Below are my notes for this consultation.    If you have questions, please do not hesitate to call me. I look forward to following your patient along with you.         Sincerely,        Alexandre Fernando DO        CC: No Recipients  Alexandre Fernando DO  10/27/2022  3:23 PM  Signed      Chief Complaint: I saw Rinku in the office today on a routine visit for his progressive lower extremity edema and weight gain.  He denies any form of chest pain but he has shortness of breath with exertion anxiety cold weather not postprandially at rest or nocturnally.  He gets short of breath climbing stairs, he denies proximal nocturnal dyspnea orthopnea denies palpitations syncope has severe lower extremity edema but he has lymphedema and is got varicosities he has very dry skin and a venous stasis ulcer.       Medications:  Current Outpatient Medications   Medication Sig Dispense Refill    albuterol HFA (VENTOLIN HFA) 90 mcg/actuation inhaler inhale ONE TO TWO PUFFS EVERY 6 HOURS AS NEEDED FOR WHEEZING  0    aspirin 81 mg enteric coated tablet TAKE ONE TABLET BY MOUTH DAILY 90 tablet 3    DULoxetine (CYMBALTA) 60 mg capsule Take 1 capsule by mouth at bedtime.      empagliflozin (JARDIANCE) 10 mg tablet Take 1 tablet (10 mg total) by mouth daily. 90 tablet 3    furosemide (LASIX) 20 mg tablet Take 3 tablets (60 mg total) by mouth once daily. (Patient taking differently: Take 80 mg by mouth once daily.) 270 tablet 3    lisinopriL (PRINIVIL) 40 mg tablet Take 1 tablet (40 mg total) by mouth once daily. 90 tablet 3    metoprolol succinate XL (TOPROL-XL) 50 mg 24 hr tablet Take 50 mg by mouth daily.      rosuvastatin (CRESTOR) 20 mg tablet Take 1 tablet (20 mg total) by  mouth daily. 90 tablet 3    spironolactone (ALDACTONE) 25 mg tablet Take 1 tablet (25 mg total) by mouth once daily. 90 tablet 3    umeclidinium-vilanterol (ANORO ELLIPTA) 62.5-25 mcg/actuation blister with device Inhale 1 puff daily.       No current facility-administered medications for this visit.       Review of Systems:  Review of Systems   Constitutional: Positive for weight gain.   HENT: Positive for congestion.    Eyes: Negative.    Cardiovascular: Positive for dyspnea on exertion and leg swelling.   Respiratory: Positive for shortness of breath.    Endocrine: Negative.    Hematologic/Lymphatic: Negative.    Skin: Positive for color change, dry skin, itching, poor wound healing and rash.   Musculoskeletal: Positive for joint pain and myalgias.   Gastrointestinal: Negative.    Genitourinary: Positive for nocturia.   Neurological: Negative.    Psychiatric/Behavioral: Negative.    Allergic/Immunologic: Negative.        Physical Exam:  Vitals:    10/27/22 1451   Pulse: 95   Resp: 20     Physical Exam  Constitutional:       Appearance: He is well-developed and well-nourished.   HENT:      Head: Normocephalic and atraumatic.   Eyes:      Extraocular Movements: EOM normal.      Conjunctiva/sclera: Conjunctivae normal.      Pupils: Pupils are equal, round, and reactive to light.   Cardiovascular:      Rate and Rhythm: Normal rate and regular rhythm.      Pulses: Intact distal pulses.      Heart sounds:     Gallop present.   Pulmonary:      Effort: Pulmonary effort is normal.      Breath sounds: Normal breath sounds.   Abdominal:      General: Bowel sounds are normal.      Palpations: Abdomen is soft.   Musculoskeletal:         General: Normal range of motion.      Cervical back: Normal range of motion and neck supple.      Right lower leg: Edema present.      Left lower leg: Edema present.   Skin:     General: Skin is warm, dry and intact.      Findings: Erythema and rash present.   Neurological:      Mental  Status: He is alert and oriented to person, place, and time.      Motor: Motor strength is normal.      Deep Tendon Reflexes: Reflexes are normal and symmetric.   Psychiatric:         Mood and Affect: Mood and affect normal.         Speech: Speech normal.         Behavior: Behavior normal.         Thought Content: Thought content normal.         Cognition and Memory: Cognition and memory normal.         Judgment: Judgment normal.       EKG:SR PRWP V!-4 ST-Tabn    Assessment and Plan:  Impression:  Dyspnea secondary to COPD.  Coronary artery disease with stent to right coronary artery.  Cellulitis of the lower extremity.  Obesity.  Venous insufficiency and lymphedema.  Venous stasis wound.  Recommendation:  I renewed his prescription for Jardiance to treat his diastolic heart failure which he has I renewed his spironolactone and agree with the increase in Lasix.  However: He also has lymphedema and a cellulitis with an open wound so I referred him to our wound care center which they have been really good with lymphedema I started him on Keflex to take for cellulitis alga wound we will see him in 6 weeks.  If there is a problem we will see him sooner.  Thank you for allowing us see this nice gentleman in the office today.    Alexandre Fernando,

## 2022-10-27 NOTE — PROGRESS NOTES
Chief Complaint: I saw Rinku in the office today on a routine visit for his progressive lower extremity edema and weight gain.  He denies any form of chest pain but he has shortness of breath with exertion anxiety cold weather not postprandially at rest or nocturnally.  He gets short of breath climbing stairs, he denies proximal nocturnal dyspnea orthopnea denies palpitations syncope has severe lower extremity edema but he has lymphedema and is got varicosities he has very dry skin and a venous stasis ulcer.       Medications:  Current Outpatient Medications   Medication Sig Dispense Refill    albuterol HFA (VENTOLIN HFA) 90 mcg/actuation inhaler inhale ONE TO TWO PUFFS EVERY 6 HOURS AS NEEDED FOR WHEEZING  0    aspirin 81 mg enteric coated tablet TAKE ONE TABLET BY MOUTH DAILY 90 tablet 3    DULoxetine (CYMBALTA) 60 mg capsule Take 1 capsule by mouth at bedtime.      empagliflozin (JARDIANCE) 10 mg tablet Take 1 tablet (10 mg total) by mouth daily. 90 tablet 3    furosemide (LASIX) 20 mg tablet Take 3 tablets (60 mg total) by mouth once daily. (Patient taking differently: Take 80 mg by mouth once daily.) 270 tablet 3    lisinopriL (PRINIVIL) 40 mg tablet Take 1 tablet (40 mg total) by mouth once daily. 90 tablet 3    metoprolol succinate XL (TOPROL-XL) 50 mg 24 hr tablet Take 50 mg by mouth daily.      rosuvastatin (CRESTOR) 20 mg tablet Take 1 tablet (20 mg total) by mouth daily. 90 tablet 3    spironolactone (ALDACTONE) 25 mg tablet Take 1 tablet (25 mg total) by mouth once daily. 90 tablet 3    umeclidinium-vilanterol (ANORO ELLIPTA) 62.5-25 mcg/actuation blister with device Inhale 1 puff daily.       No current facility-administered medications for this visit.       Review of Systems:  Review of Systems   Constitutional: Positive for weight gain.   HENT: Positive for congestion.    Eyes: Negative.    Cardiovascular: Positive for dyspnea on exertion and leg swelling.   Respiratory: Positive for  shortness of breath.    Endocrine: Negative.    Hematologic/Lymphatic: Negative.    Skin: Positive for color change, dry skin, itching, poor wound healing and rash.   Musculoskeletal: Positive for joint pain and myalgias.   Gastrointestinal: Negative.    Genitourinary: Positive for nocturia.   Neurological: Negative.    Psychiatric/Behavioral: Negative.    Allergic/Immunologic: Negative.        Physical Exam:  Vitals:    10/27/22 1451   Pulse: 95   Resp: 20     Physical Exam  Constitutional:       Appearance: He is well-developed and well-nourished.   HENT:      Head: Normocephalic and atraumatic.   Eyes:      Extraocular Movements: EOM normal.      Conjunctiva/sclera: Conjunctivae normal.      Pupils: Pupils are equal, round, and reactive to light.   Cardiovascular:      Rate and Rhythm: Normal rate and regular rhythm.      Pulses: Intact distal pulses.      Heart sounds:     Gallop present.   Pulmonary:      Effort: Pulmonary effort is normal.      Breath sounds: Normal breath sounds.   Abdominal:      General: Bowel sounds are normal.      Palpations: Abdomen is soft.   Musculoskeletal:         General: Normal range of motion.      Cervical back: Normal range of motion and neck supple.      Right lower leg: Edema present.      Left lower leg: Edema present.   Skin:     General: Skin is warm, dry and intact.      Findings: Erythema and rash present.   Neurological:      Mental Status: He is alert and oriented to person, place, and time.      Motor: Motor strength is normal.      Deep Tendon Reflexes: Reflexes are normal and symmetric.   Psychiatric:         Mood and Affect: Mood and affect normal.         Speech: Speech normal.         Behavior: Behavior normal.         Thought Content: Thought content normal.         Cognition and Memory: Cognition and memory normal.         Judgment: Judgment normal.       EKG:SR PRWP V!-4 ST-Tabn    Assessment and Plan:  Impression:  Dyspnea secondary to COPD.  Coronary artery  disease with stent to right coronary artery.  Cellulitis of the lower extremity.  Obesity.  Venous insufficiency and lymphedema.  Venous stasis wound.  Recommendation:  I renewed his prescription for Jardiance to treat his diastolic heart failure which he has I renewed his spironolactone and agree with the increase in Lasix.  However: He also has lymphedema and a cellulitis with an open wound so I referred him to our wound care center which they have been really good with lymphedema I started him on Keflex to take for cellulitis alga wound we will see him in 6 weeks.  If there is a problem we will see him sooner.  Thank you for allowing us see this nice gentleman in the office today.    Alexandre Fernando, DO

## 2022-11-02 ENCOUNTER — OFFICE VISIT (OUTPATIENT)
Dept: WOUND CARE | Facility: HOSPITAL | Age: 57
End: 2022-11-02
Attending: SURGERY
Payer: COMMERCIAL

## 2022-11-02 VITALS — RESPIRATION RATE: 18 BRPM | HEART RATE: 95 BPM | TEMPERATURE: 98.2 F

## 2022-11-02 DIAGNOSIS — R60.0 EDEMA OF BOTH LOWER EXTREMITIES: ICD-10-CM

## 2022-11-02 DIAGNOSIS — L90.9 ATROPHIC CONDITION OF SKIN: ICD-10-CM

## 2022-11-02 DIAGNOSIS — S81.802A OPEN WOUND OF BOTH LOWER EXTREMITIES WITH COMPLICATION, INITIAL ENCOUNTER: Primary | ICD-10-CM

## 2022-11-02 DIAGNOSIS — I87.309 CHRONIC PERIPHERAL VENOUS HYPERTENSION: ICD-10-CM

## 2022-11-02 DIAGNOSIS — Z72.0 TOBACCO ABUSE: ICD-10-CM

## 2022-11-02 DIAGNOSIS — S81.801A OPEN WOUND OF BOTH LOWER EXTREMITIES WITH COMPLICATION, INITIAL ENCOUNTER: Primary | ICD-10-CM

## 2022-11-02 PROCEDURE — 27200104 HC MEDICOPASTE UNNA BOOT

## 2022-11-02 PROCEDURE — 29580 STRAPPING UNNA BOOT: CPT | Mod: 50 | Performed by: SURGERY

## 2022-11-02 PROCEDURE — 99214 OFFICE O/P EST MOD 30 MIN: CPT | Performed by: SURGERY

## 2022-11-02 PROCEDURE — G0463 HOSPITAL OUTPT CLINIC VISIT: HCPCS

## 2022-11-02 PROCEDURE — 29580 STRAPPING UNNA BOOT: CPT | Mod: 50

## 2022-11-02 ASSESSMENT — ENCOUNTER SYMPTOMS
ABDOMINAL DISTENTION: 0
FREQUENCY: 0
WOUND: 1
APPETITE CHANGE: 0
NERVOUS/ANXIOUS: 0
FATIGUE: 1
BRUISES/BLEEDS EASILY: 0
FEVER: 0
FLANK PAIN: 0
HEMATURIA: 0
COLOR CHANGE: 0
COUGH: 0
POLYDIPSIA: 0
JOINT SWELLING: 0
WEAKNESS: 1
CONFUSION: 0
SHORTNESS OF BREATH: 0
RHINORRHEA: 0
NUMBNESS: 0
EYE REDNESS: 0
EYE DISCHARGE: 0

## 2022-11-02 NOTE — ASSESSMENT & PLAN NOTE
The patient is made aware of the deleterious effects of smoking and nicotine on wound healing.  He has been provided resources within the [facility] to assist him with smoking cessation.

## 2022-11-02 NOTE — ASSESSMENT & PLAN NOTE
Rx: Knee-high gradient compression garments, 15/20 mmHg Referred to [facility] for fitting and acquisition    PLAN:  Maintain leg elevation above level of the heart as much as possible.  Restrict fluid intake to < 66oz/24 hours  Restrict salt intake to no more than 7680-3175 mgs/sodium/day  Minimize prolonged sitting and standing  Remain active to point of physical tolerance

## 2022-11-02 NOTE — LETTER
"November 3, 2022     Hyacinth White  [address] [facility] [address]  [facility] [address]    Patient: Rinku Maier  YOB: 1965  Date of Visit: 2022      Dear Dr. White:    Thank you for referring Rinku Maier to me for evaluation. Below are my notes for this consultation.    If you have questions, please do not hesitate to call me. I look forward to following your patient along with you.         Sincerely,        INDERJIT Madrigal MD        CC: DO Rohan Parish W Randall, MD  11/3/2022 12:20 PM  Signed  Patient ID: Rinku Maier                            : 1965  MRN: [id number]                                            Visit Date: 2022  Encounter Provider: INDERJIT Madrigal  Referring Provider: No ref. provider found    Subjective       Last patient encounter 2022; today's visit constitutes established patient, level 4    HPI  Rinku is a 57 y.o. old male with a chief compliant of Leg Swelling and Chronic Venous Insufficiency w/ Ulceration   presenting today for evaluation and management of a lower extremity wound.  Patient was initially seen in the wound center in 2022.  He was offered a prescription for knee-high gradient compression stockings and referred to [facility]'s pharmacy for fitting.  He was asked to follow-up in 4 weeks for reevaluation, but never did so.  Patient was seen at his cardiologist office on 10/27/2022 for progressive lower extremity edema and weight gain.  He was noted to have severe lower extremity edema with superficial varicosities and dry skin.  Venous stasis ulceration was documented.  Dr. Fernando renewed his prescription for Jardiance and spironolactone and increased furosemide dose.  He was started on cephalexin for \"cellulitis\".    The following have been reviewed and updated as appropriate in this visit:   Tobacco  Allergies  Meds  Problems  Med Hx  Surg Hx  Fam Hx       Past Medical History:  has a " past medical history of Abnormal ECG, COPD (chronic obstructive pulmonary disease) (CMS/[facility]), MUELLER (dyspnea on exertion), Hypertension, Peripheral edema, and Sleep apnea.    He has no past medical history of Alcoholism (CMS/[facility]) or Substance abuse (CMS/[facility]).  Past Surgical History:  has a past surgical history that includes Hernia repair.  Social History:   Social History     Tobacco Use    Smoking status: Every Day     Packs/day: 0.25     Types: Cigarettes     Last attempt to quit: 5/10/2019     Years since quitting: 3.4    Smokeless tobacco: Never    Tobacco comments:     former 2 packs a day smoker, currently taking chantix   Substance Use Topics    Alcohol use: Yes     Comment: occasionally    Drug use: Defer     Family History: family history includes Heart attack in his biological father; Stomach cancer in his biological mother and biological sister; Stroke in his biological father.  Medications:   Current Outpatient Medications:     albuterol HFA (VENTOLIN HFA) 90 mcg/actuation inhaler, inhale ONE TO TWO PUFFS EVERY 6 HOURS AS NEEDED FOR WHEEZING, Disp: , Rfl: 0    aspirin 81 mg enteric coated tablet, TAKE ONE TABLET BY MOUTH DAILY, Disp: 90 tablet, Rfl: 3    cephalexin (KEFLEX) 500 mg capsule, Take 1 capsule (500 mg total) by mouth 4 (four) times a day for 7 days., Disp: 28 capsule, Rfl: 0    DULoxetine (CYMBALTA) 60 mg capsule, Take 1 capsule by mouth at bedtime., Disp: , Rfl:     empagliflozin (JARDIANCE) 10 mg tablet, Take 1 tablet (10 mg total) by mouth daily., Disp: 90 tablet, Rfl: 3    furosemide (LASIX) 20 mg tablet, Take 3 tablets (60 mg total) by mouth once daily. (Patient taking differently: Take 80 mg by mouth once daily.), Disp: 270 tablet, Rfl: 3    lisinopriL (PRINIVIL) 40 mg tablet, Take 1 tablet (40 mg total) by mouth once daily., Disp: 90 tablet, Rfl: 3    metoprolol succinate XL (TOPROL-XL) 50 mg 24 hr tablet, Take 50 mg by mouth daily., Disp: , Rfl:     rosuvastatin (CRESTOR)  20 mg tablet, Take 1 tablet (20 mg total) by mouth daily., Disp: 90 tablet, Rfl: 3    spironolactone (ALDACTONE) 25 mg tablet, Take 1 tablet (25 mg total) by mouth once daily., Disp: 90 tablet, Rfl: 3    umeclidinium-vilanterol (ANORO ELLIPTA) 62.5-25 mcg/actuation blister with device, Inhale 1 puff daily., Disp: , Rfl:     Allergies: has No Known Allergies.     Review of Systems   Constitutional: Positive for fatigue. Negative for appetite change and fever.   HENT: Negative for congestion and rhinorrhea.    Eyes: Negative for discharge and redness.   Respiratory: Negative for cough and shortness of breath.    Cardiovascular: Positive for leg swelling.   Gastrointestinal: Negative for abdominal distention.   Endocrine: Negative for cold intolerance, heat intolerance and polydipsia.   Genitourinary: Negative for flank pain, frequency and hematuria.   Musculoskeletal: Negative for gait problem and joint swelling.   Skin: Positive for wound. Negative for color change.   Allergic/Immunologic: Negative for immunocompromised state.   Neurological: Positive for weakness. Negative for numbness.   Hematological: Does not bruise/bleed easily.   Psychiatric/Behavioral: Negative for confusion. The patient is not nervous/anxious.      Glucose, Serum   Date Value Ref Range Status   06/12/2019 102 (H) 65 - 99 mg/dL Final     Hemoglobin   Date Value Ref Range Status   06/12/2019 15.9 13.0 - 17.7 g/dL Final     Creatinine, Serum   Date Value Ref Range Status   06/12/2019 1.29 (H) 0.76 - 1.27 mg/dL Final   ]    Objective    Visit Vitals  Pulse 95   Temp 36.8 °C (98.2 °F)   Resp 18       Physical Exam  Vitals and nursing note reviewed.   Constitutional:       General: He is not in acute distress.     Appearance: Normal appearance.   HENT:      Head: Normocephalic and atraumatic.   Eyes:      Conjunctiva/sclera: Conjunctivae normal.      Pupils: Pupils are equal, round, and reactive to light.   Neck:      Trachea: Trachea normal.    Cardiovascular:      Rate and Rhythm: Regular rhythm.      Pulses: Intact distal pulses.   Pulmonary:      Effort: Pulmonary effort is normal. No respiratory distress.      Breath sounds: No wheezing.   Abdominal:      Palpations: Abdomen is soft.      Tenderness: There is no guarding.   Musculoskeletal:         General: Swelling present. No deformity.      Cervical back: Normal range of motion. No edema or erythema.      Right lower leg: Edema present.      Left lower leg: Edema present.        Legs:    Skin:     General: Skin is warm.      Capillary Refill: Capillary refill takes less than 2 seconds.   Neurological:      Mental Status: He is alert and oriented to person, place, and time.      Motor: Weakness present.   Psychiatric:         Attention and Perception: He is attentive.         Mood and Affect: Affect is not inappropriate.         Speech: Speech normal.         Behavior: Behavior normal. Behavior is cooperative.         Cognition and Memory: Cognition and memory normal.                        Right posterior leg                                             left posterior leg          During visit patient received cleansing with gauze and saline to all wounds to assist in removing bioburden and loosely adhered slough    Assessment/Plan     Diagnosis Plan   1. Open wound of both lower extremities with complication, initial encounter        2. Atrophic condition of skin        3. Tobacco abuse        4. Edema of both lower extremities        5. Chronic peripheral venous hypertension          Problem List Items Addressed This Visit     Tobacco abuse     The patient is made aware of the deleterious effects of smoking and nicotine on wound healing.  [facility] has been provided resources [facility] to assist him with smoking cessation.         Edema of both lower extremities     Rx: Knee-high gradient compression garments, 15/20 mmHg Referred to [facility]'s pharmacy for fitting and  acquisition    PLAN:  Maintain leg elevation above level of the heart as much as possible.  Restrict fluid intake to < 66oz/24 hours  Restrict salt intake to no more than 6338-4702 mgs/sodium/day  Minimize prolonged sitting and standing  Remain active to point of physical tolerance         Chronic peripheral venous hypertension     Plan:  Patient is asked to apply a nightly moisturizer to his legs.  He should wear some form of compression garment during waking hours.  I am pleased that he is sleeping in bed.  When he is sitting out of bed he should elevate his legs as much as possible.  He has limited ambulatory potential because of his cardiac and COPD issues.  I see no evidence of peripheral arterial disease.  I do not think he is a reasonable candidate for vein ablation.    The patient would be offered Unna boot compression therapy if he were to develop wounds or severe stasis dermatitis in the future.         Atrophic condition of skin     PLAN:  Eucerin Original Healing Cream to dry skin of legs daily.     It is recommended that you clean the wound using running warm water, a mild bath soap, and a soft wash cloth or bath sponge. Proper cleansing of your wound is essential for wound healing to take place. Inadequate cleansing leads to the build up of yellow or green material in the wound bed, providing overgrowth of bacteria and noxious bacterial by products.         Open wound of both lower extremities with complication - Primary     PLAN:  Bilateral Unna Boots applied by RN     Patient is given specific instructions regarding care of her Unna Boot.  It is essential that the patient keep legs elevated as much as possible, but short distance ambulation is encouraged and does increase efficacy of Unna Boot compression therapy.  The Unna Boot must be kept dry        This document was generated using speech recognition software. Please excuse any typographical errors.    Return to wound center in 1 week    W.  Myles. MD Rohan

## 2022-11-02 NOTE — ASSESSMENT & PLAN NOTE
PLAN:  Bilateral Unna Boots applied by RN     Patient is given specific instructions regarding care of her Unna Boot.  It is essential that the patient keep legs elevated as much as possible, but short distance ambulation is encouraged and does increase efficacy of Unna Boot compression therapy.  The Unna Boot must be kept dry

## 2022-11-02 NOTE — PROGRESS NOTES
"Patient ID: Rinku Maier                            : 1965  MRN: [id number]                                            Visit Date: 2022  Encounter Provider: INDERJIT Madrigal  Referring Provider: No ref. provider found    Subjective      Last patient encounter 2022; today's visit constitutes established patient, level 4    HPI  Rinku is a 57 y.o. old male with a chief compliant of Leg Swelling and Chronic Venous Insufficiency w/ Ulceration   presenting today for evaluation and management of a lower extremity wound.  Patient was initially seen in the wound center in 2022.  He was offered a prescription for knee-high gradient compression stockings and referred to [facility]'s pharmacy for fitting.  He was asked to follow-up in 4 weeks for reevaluation, but never did so.  Patient was seen at his cardiologist office on 10/27/2022 for progressive lower extremity edema and weight gain.  He was noted to have severe lower extremity edema with superficial varicosities and dry skin.  Venous stasis ulceration was documented.  Dr. Fernando renewed his prescription for Jardiance and spironolactone and increased furosemide dose.  He was started on cephalexin for \"cellulitis\".    The following have been reviewed and updated as appropriate in this visit:   Tobacco  Allergies  Meds  Problems  Med Hx  Surg Hx  Fam Hx       Past Medical History:  has a past medical history of Abnormal ECG, COPD (chronic obstructive pulmonary disease) (CMS/[facility]), MUELLER (dyspnea on exertion), Hypertension, Peripheral edema, and Sleep apnea.    He has no past medical history of Alcoholism (CMS/[facility]) or Substance abuse (CMS/[facility]).  Past Surgical History:  has a past surgical history that includes Hernia repair.  Social History:   Social History     Tobacco Use    Smoking status: Every Day     Packs/day: 0.25     Types: Cigarettes     Last attempt to quit: 5/10/2019     Years since quitting: 3.4    Smokeless tobacco: " Never    Tobacco comments:     former 2 packs a day smoker, currently taking chantix   Substance Use Topics    Alcohol use: Yes     Comment: occasionally    Drug use: Defer     Family History: family history includes Heart attack in his biological father; Stomach cancer in his biological mother and biological sister; Stroke in his biological father.  Medications:   Current Outpatient Medications:     albuterol HFA (VENTOLIN HFA) 90 mcg/actuation inhaler, inhale ONE TO TWO PUFFS EVERY 6 HOURS AS NEEDED FOR WHEEZING, Disp: , Rfl: 0    aspirin 81 mg enteric coated tablet, TAKE ONE TABLET BY MOUTH DAILY, Disp: 90 tablet, Rfl: 3    cephalexin (KEFLEX) 500 mg capsule, Take 1 capsule (500 mg total) by mouth 4 (four) times a day for 7 days., Disp: 28 capsule, Rfl: 0    DULoxetine (CYMBALTA) 60 mg capsule, Take 1 capsule by mouth at bedtime., Disp: , Rfl:     empagliflozin (JARDIANCE) 10 mg tablet, Take 1 tablet (10 mg total) by mouth daily., Disp: 90 tablet, Rfl: 3    furosemide (LASIX) 20 mg tablet, Take 3 tablets (60 mg total) by mouth once daily. (Patient taking differently: Take 80 mg by mouth once daily.), Disp: 270 tablet, Rfl: 3    lisinopriL (PRINIVIL) 40 mg tablet, Take 1 tablet (40 mg total) by mouth once daily., Disp: 90 tablet, Rfl: 3    metoprolol succinate XL (TOPROL-XL) 50 mg 24 hr tablet, Take 50 mg by mouth daily., Disp: , Rfl:     rosuvastatin (CRESTOR) 20 mg tablet, Take 1 tablet (20 mg total) by mouth daily., Disp: 90 tablet, Rfl: 3    spironolactone (ALDACTONE) 25 mg tablet, Take 1 tablet (25 mg total) by mouth once daily., Disp: 90 tablet, Rfl: 3    umeclidinium-vilanterol (ANORO ELLIPTA) 62.5-25 mcg/actuation blister with device, Inhale 1 puff daily., Disp: , Rfl:     Allergies: has No Known Allergies.     Review of Systems   Constitutional: Positive for fatigue. Negative for appetite change and fever.   HENT: Negative for congestion and rhinorrhea.    Eyes: Negative for discharge and  redness.   Respiratory: Negative for cough and shortness of breath.    Cardiovascular: Positive for leg swelling.   Gastrointestinal: Negative for abdominal distention.   Endocrine: Negative for cold intolerance, heat intolerance and polydipsia.   Genitourinary: Negative for flank pain, frequency and hematuria.   Musculoskeletal: Negative for gait problem and joint swelling.   Skin: Positive for wound. Negative for color change.   Allergic/Immunologic: Negative for immunocompromised state.   Neurological: Positive for weakness. Negative for numbness.   Hematological: Does not bruise/bleed easily.   Psychiatric/Behavioral: Negative for confusion. The patient is not nervous/anxious.      Glucose, Serum   Date Value Ref Range Status   06/12/2019 102 (H) 65 - 99 mg/dL Final     Hemoglobin   Date Value Ref Range Status   06/12/2019 15.9 13.0 - 17.7 g/dL Final     Creatinine, Serum   Date Value Ref Range Status   06/12/2019 1.29 (H) 0.76 - 1.27 mg/dL Final   ]    Objective   Visit Vitals  Pulse 95   Temp 36.8 °C (98.2 °F)   Resp 18       Physical Exam  Vitals and nursing note reviewed.   Constitutional:       General: He is not in acute distress.     Appearance: Normal appearance.   HENT:      Head: Normocephalic and atraumatic.   Eyes:      Conjunctiva/sclera: Conjunctivae normal.      Pupils: Pupils are equal, round, and reactive to light.   Neck:      Trachea: Trachea normal.   Cardiovascular:      Rate and Rhythm: Regular rhythm.      Pulses: Intact distal pulses.   Pulmonary:      Effort: Pulmonary effort is normal. No respiratory distress.      Breath sounds: No wheezing.   Abdominal:      Palpations: Abdomen is soft.      Tenderness: There is no guarding.   Musculoskeletal:         General: Swelling present. No deformity.      Cervical back: Normal range of motion. No edema or erythema.      Right lower leg: Edema present.      Left lower leg: Edema present.        Legs:    Skin:     General: Skin is warm.       Capillary Refill: Capillary refill takes less than 2 seconds.   Neurological:      Mental Status: He is alert and oriented to person, place, and time.      Motor: Weakness present.   Psychiatric:         Attention and Perception: He is attentive.         Mood and Affect: Affect is not inappropriate.         Speech: Speech normal.         Behavior: Behavior normal. Behavior is cooperative.         Cognition and Memory: Cognition and memory normal.                        Right posterior leg                                             left posterior leg          During visit patient received cleansing with gauze and saline to all wounds to assist in removing bioburden and loosely adhered slough    Assessment/Plan    Diagnosis Plan   1. Open wound of both lower extremities with complication, initial encounter        2. Atrophic condition of skin        3. Tobacco abuse        4. Edema of both lower extremities        5. Chronic peripheral venous hypertension          Problem List Items Addressed This Visit     Tobacco abuse     The patient is made aware of the deleterious effects of smoking and nicotine on wound healing.  [facility] to assist him with smoking cessation.         Edema of both lower extremities     Rx: Knee-high gradient compression garments, 15/20 mmHg Referred to [facility]'s pharmacy for fitting and acquisition    PLAN:  Maintain leg elevation above level of the heart as much as possible.  Restrict fluid intake to < 66oz/24 hours  Restrict salt intake to no more than 1086-1889 mgs/sodium/day  Minimize prolonged sitting and standing  Remain active to point of physical tolerance         Chronic peripheral venous hypertension     Plan:  Patient is asked to apply a nightly moisturizer to his legs.  He should wear some form of compression garment during waking hours.  I am pleased that he is sleeping in bed.  When he is sitting out of bed he should elevate  his legs as much as possible.  He has limited ambulatory potential because of his cardiac and COPD issues.  I see no evidence of peripheral arterial disease.  I do not think he is a reasonable candidate for vein ablation.    The patient would be offered Unna boot compression therapy if he were to develop wounds or severe stasis dermatitis in the future.         Atrophic condition of skin     PLAN:  Eucerin Original Healing Cream to dry skin of legs daily.     It is recommended that you clean the wound using running warm water, a mild bath soap, and a soft wash cloth or bath sponge. Proper cleansing of your wound is essential for wound healing to take place. Inadequate cleansing leads to the build up of yellow or green material in the wound bed, providing overgrowth of bacteria and noxious bacterial by products.         Open wound of both lower extremities with complication - Primary     PLAN:  Bilateral Unna Boots applied by RN     Patient is given specific instructions regarding care of her Unna Boot.  It is essential that the patient keep legs elevated as much as possible, but short distance ambulation is encouraged and does increase efficacy of Unna Boot compression therapy.  The Unna Boot must be kept dry        This document was generated using speech recognition software. Please excuse any typographical errors.    Return to wound center in 1 week     INDERJIT Madrigal MD

## 2022-11-02 NOTE — PATIENT INSTRUCTIONS
COMPRESSION THERAPY     Unna Boot  Both Legs  An UNNA BOOT (compression wrap) has been applied today     Unna Boot is the brown wrap on your leg(s).    Do not remove or unwrap. Keep boot dry at all times; cover with seal-tight device or a cast over if showering.    A wet Unna boot should be removed as soon as possible to prevent infection and any damage to healthy skin.    If numbness, tingling or increased pain develops, notify the [facility]aling Center at [phone].        ACTIVITY     Use of a recliner chair at home can often help in the appropriate elevation of your legs above the level of your heart  Normal activity then rest and elevation  May walk

## 2022-11-15 ENCOUNTER — OFFICE VISIT (OUTPATIENT)
Dept: WOUND CARE | Facility: HOSPITAL | Age: 57
End: 2022-11-15
Attending: SURGERY
Payer: COMMERCIAL

## 2022-11-15 VITALS — HEART RATE: 100 BPM | RESPIRATION RATE: 22 BRPM | TEMPERATURE: 98.1 F

## 2022-11-15 DIAGNOSIS — Z72.0 TOBACCO ABUSE: ICD-10-CM

## 2022-11-15 DIAGNOSIS — R60.0 EDEMA OF BOTH LOWER EXTREMITIES: ICD-10-CM

## 2022-11-15 DIAGNOSIS — L85.3 XEROSIS OF SKIN: ICD-10-CM

## 2022-11-15 DIAGNOSIS — S81.802D OPEN WOUND OF BOTH LOWER EXTREMITIES WITH COMPLICATION, SUBSEQUENT ENCOUNTER: Primary | ICD-10-CM

## 2022-11-15 DIAGNOSIS — S81.801D OPEN WOUND OF BOTH LOWER EXTREMITIES WITH COMPLICATION, SUBSEQUENT ENCOUNTER: Primary | ICD-10-CM

## 2022-11-15 DIAGNOSIS — I87.309 CHRONIC PERIPHERAL VENOUS HYPERTENSION: ICD-10-CM

## 2022-11-15 PROCEDURE — G0463 HOSPITAL OUTPT CLINIC VISIT: HCPCS | Performed by: SURGERY

## 2022-11-15 PROCEDURE — 99213 OFFICE O/P EST LOW 20 MIN: CPT | Performed by: SURGERY

## 2022-11-15 PROCEDURE — G0463 HOSPITAL OUTPT CLINIC VISIT: HCPCS

## 2022-11-15 ASSESSMENT — ENCOUNTER SYMPTOMS
NUMBNESS: 0
FLANK PAIN: 0
EYE REDNESS: 0
CONFUSION: 0
JOINT SWELLING: 0
NERVOUS/ANXIOUS: 0
FEVER: 0
BRUISES/BLEEDS EASILY: 0
RHINORRHEA: 0
WOUND: 1
FATIGUE: 1
ABDOMINAL DISTENTION: 0
WEAKNESS: 1
EYE DISCHARGE: 0
FREQUENCY: 0
HEMATURIA: 0
APPETITE CHANGE: 0
COUGH: 0
COLOR CHANGE: 0
POLYDIPSIA: 0
SHORTNESS OF BREATH: 0

## 2022-11-15 NOTE — ASSESSMENT & PLAN NOTE
No need for Unna boot application at this point in time.    Will treat patient with conservative measures in minimizing excessive edema of the lower extremities.  These measures include, and forced leg elevation, physical activity to the point of tolerance, routine hygiene and skin care, and use of some form of compression garment, such as a Tubigrip or prescribed knee-high compression stockings.    Patient will alert the wound center if she notes increase edema, or any recurrence of ulceration or point of drainage.

## 2022-11-15 NOTE — PROGRESS NOTES
Patient ID: Rinku Maier                            : 1965  MRN: [id number]                                            Visit Date: 11/15/2022  Encounter Provider: INDERJIT Madrigal  Referring Provider: No ref. provider found    Subjective      HPI  Rinku is a 57 y.o. old male with a chief compliant of Chronic Non-healing Wound and Leg Swelling   presenting today for evaluation and management of a lower extremity wound.  Patient tolerated bilateral Unna boot compression over the past week.  He presented last week with dry skin/eschars over both posterior calves.  Says he has wounds that never heal.  I thought 1 or 2 weeks of Unna boot compression therapy would remove the eschars.  Less edema.  No complaints of pain.    The following have been reviewed and updated as appropriate in this visit:   Tobacco  Allergies  Meds  Problems  Med Hx  Surg Hx  Fam Hx       Past Medical History:  has a past medical history of Abnormal ECG, COPD (chronic obstructive pulmonary disease) (CMS/[facility]), MUELLER (dyspnea on exertion), Hypertension, Peripheral edema, and Sleep apnea.    He has no past medical history of Alcoholism (CMS/[facility]) or Substance abuse (CMS/[facility]).  Past Surgical History:  has a past surgical history that includes Hernia repair.  Social History:   Social History     Tobacco Use    Smoking status: Every Day     Packs/day: 0.25     Types: Cigarettes     Last attempt to quit: 5/10/2019     Years since quitting: 3.5    Smokeless tobacco: Never    Tobacco comments:     former 2 packs a day smoker, currently taking chantix   Substance Use Topics    Alcohol use: Yes     Comment: occasionally    Drug use: Defer     Family History: family history includes Heart attack in his biological father; Stomach cancer in his biological mother and biological sister; Stroke in his biological father.  Medications:   Current Outpatient Medications:     albuterol HFA (VENTOLIN HFA) 90 mcg/actuation inhaler, inhale ONE  TO TWO PUFFS EVERY 6 HOURS AS NEEDED FOR WHEEZING, Disp: , Rfl: 0    aspirin 81 mg enteric coated tablet, TAKE ONE TABLET BY MOUTH DAILY, Disp: 90 tablet, Rfl: 3    DULoxetine (CYMBALTA) 60 mg capsule, Take 1 capsule by mouth at bedtime., Disp: , Rfl:     empagliflozin (JARDIANCE) 10 mg tablet, Take 1 tablet (10 mg total) by mouth daily., Disp: 90 tablet, Rfl: 3    furosemide (LASIX) 20 mg tablet, Take 3 tablets (60 mg total) by mouth once daily. (Patient taking differently: Take 80 mg by mouth once daily.), Disp: 270 tablet, Rfl: 3    lisinopriL (PRINIVIL) 40 mg tablet, Take 1 tablet (40 mg total) by mouth once daily., Disp: 90 tablet, Rfl: 3    metoprolol succinate XL (TOPROL-XL) 50 mg 24 hr tablet, Take 50 mg by mouth daily., Disp: , Rfl:     rosuvastatin (CRESTOR) 20 mg tablet, Take 1 tablet (20 mg total) by mouth daily., Disp: 90 tablet, Rfl: 3    spironolactone (ALDACTONE) 25 mg tablet, Take 1 tablet (25 mg total) by mouth once daily., Disp: 90 tablet, Rfl: 3    umeclidinium-vilanterol (ANORO ELLIPTA) 62.5-25 mcg/actuation blister with device, Inhale 1 puff daily., Disp: , Rfl:     Allergies: has No Known Allergies.     Review of Systems   Constitutional: Positive for fatigue. Negative for appetite change and fever.   HENT: Negative for congestion and rhinorrhea.    Eyes: Negative for discharge and redness.   Respiratory: Negative for cough and shortness of breath.    Cardiovascular: Positive for leg swelling.   Gastrointestinal: Negative for abdominal distention.   Endocrine: Negative for cold intolerance, heat intolerance and polydipsia.   Genitourinary: Negative for flank pain, frequency and hematuria.   Musculoskeletal: Negative for gait problem and joint swelling.   Skin: Positive for wound. Negative for color change.   Allergic/Immunologic: Negative for immunocompromised state.   Neurological: Positive for weakness. Negative for numbness.   Hematological: Does not bruise/bleed easily.    Psychiatric/Behavioral: Negative for confusion. The patient is not nervous/anxious.      Glucose, Serum   Date Value Ref Range Status   06/12/2019 102 (H) 65 - 99 mg/dL Final     Hemoglobin   Date Value Ref Range Status   06/12/2019 15.9 13.0 - 17.7 g/dL Final     Creatinine, Serum   Date Value Ref Range Status   06/12/2019 1.29 (H) 0.76 - 1.27 mg/dL Final   ]    Objective   Visit Vitals  Pulse 100   Temp 36.7 °C (98.1 °F) (Temporal)   Resp (!) 22       Physical Exam  Vitals and nursing note reviewed.   Constitutional:       General: He is not in acute distress.     Appearance: Normal appearance.   HENT:      Head: Normocephalic and atraumatic.   Eyes:      Conjunctiva/sclera: Conjunctivae normal.      Pupils: Pupils are equal, round, and reactive to light.   Neck:      Trachea: Trachea normal.   Cardiovascular:      Rate and Rhythm: Regular rhythm.      Pulses: Intact distal pulses.   Pulmonary:      Effort: Pulmonary effort is normal. No respiratory distress.      Breath sounds: No wheezing.   Abdominal:      Palpations: Abdomen is soft.      Tenderness: There is no guarding.   Musculoskeletal:         General: Swelling present. No deformity. Normal range of motion.      Cervical back: Normal range of motion. No edema or erythema.      Right lower leg: Edema present.      Left lower leg: Edema present.        Legs:    Skin:     General: Skin is warm.      Capillary Refill: Capillary refill takes less than 2 seconds.   Neurological:      Mental Status: He is alert and oriented to person, place, and time. Mental status is at baseline.   Psychiatric:         Attention and Perception: He is attentive.         Mood and Affect: Affect is not inappropriate.         Speech: Speech normal.         Behavior: Behavior normal. Behavior is cooperative.         Cognition and Memory: Cognition and memory normal.                                                                    Right leg                                                                        Left leg                               During visit patient received cleansing with gauze and saline to all wounds to assist in removing bioburden and loosely adhered slough    Assessment/Plan    Diagnosis Plan   1. Open wound of both lower extremities with complication, subsequent encounter        2. Chronic peripheral venous hypertension        3. Edema of both lower extremities        4. Tobacco abuse        5. Xerosis of skin          Problem List Items Addressed This Visit     Tobacco abuse     The patient is made aware of the deleterious effects of smoking and nicotine on wound healing.  He has been provided resources within the [facility] to assist him with smoking cessation.         Edema of both lower extremities     Rx: Knee-high gradient compression garments, 15/20 mmHg Referred to [facility]'s pharmacy for fitting and acquisition    PLAN:  Maintain leg elevation above level of the heart as much as possible.  Restrict fluid intake to < 66oz/24 hours  Restrict salt intake to no more than 6844-1329 mgs/sodium/day  Minimize prolonged sitting and standing  Remain active to point of physical tolerance         Chronic peripheral venous hypertension     PLAN:  Bilateral Unna Boots applied by RN     Patient is given specific instructions regarding care of her Unna Boot.  It is essential that the patient keep legs elevated as much as possible, but short distance ambulation is encouraged and does increase efficacy of Unna Boot compression therapy.  The Unna Boot must be kept dry         Open wound of both lower extremities with complication - Primary     No need for Unna boot application at this point in time.    Will treat patient with conservative measures in minimizing excessive edema of the lower extremities.  These measures include, and forced leg elevation, physical activity to the point of tolerance, routine hygiene and skin care, and use of some form of compression  garment, such as a Tubigrip or prescribed knee-high compression stockings.    Patient will alert the wound center if she notes increase edema, or any recurrence of ulceration or point of drainage.         Xerosis of skin     PLAN:  I have recommended that ammonium lactate 12% lotion (Brand names: Lac-Hydrin, Am-Lactin, Moira-Lac) be applied to the dry skin of the legs and feet 1-2x daily.  These products work best when the patient is capable of washing the extremity using running warm water and a mild soap.  Use of a soft wash cloth or sponge is recommended to facilitate exfoliation of the dry skin.        This document was generated using speech recognition software. Please excuse any typographical errors.    Return to wound center as needed     INDERJIT Bueno. MD Rohan

## 2022-11-15 NOTE — ASSESSMENT & PLAN NOTE
PLAN:  I have recommended that ammonium lactate 12% lotion (Brand names: Lac-Hydrin, Am-Lactin, Moira-Lac) be applied to the dry skin of the legs and feet 1-2x daily.  These products work best when the patient is capable of washing the extremity using running warm water and a mild soap.  Use of a soft wash cloth or sponge is recommended to facilitate exfoliation of the dry skin.

## 2022-11-15 NOTE — ASSESSMENT & PLAN NOTE
Rx: Knee-high gradient compression garments, 15/20 mmHg Referred to [facility] for fitting and acquisition    PLAN:  Maintain leg elevation above level of the heart as much as possible.  Restrict fluid intake to < 66oz/24 hours  Restrict salt intake to no more than 8677-0354 mgs/sodium/day  Minimize prolonged sitting and standing  Remain active to point of physical tolerance

## 2022-11-15 NOTE — PATIENT INSTRUCTIONS
COMPRESSION THERAPY     Elevate legs above the level of the heart for four hours daily  Shower daily, then apply Amlactin lotion to both legs, this will soften the dry skin from both legs    ACTIVITY     Use of a recliner chair at home can often help in the appropriate elevation of your legs above the level of your heart  Normal activity then rest and elevation  May walk    Return if swelling returns

## 2022-12-08 ENCOUNTER — OFFICE VISIT (OUTPATIENT)
Dept: CARDIOLOGY | Facility: CLINIC | Age: 57
End: 2022-12-08
Payer: COMMERCIAL

## 2022-12-08 VITALS
BODY MASS INDEX: 44.1 KG/M2 | HEIGHT: 71 IN | DIASTOLIC BLOOD PRESSURE: 80 MMHG | RESPIRATION RATE: 16 BRPM | WEIGHT: 315 LBS | SYSTOLIC BLOOD PRESSURE: 138 MMHG | HEART RATE: 93 BPM

## 2022-12-08 DIAGNOSIS — I47.10 SVT (SUPRAVENTRICULAR TACHYCARDIA) (CMS/HCC): Primary | ICD-10-CM

## 2022-12-08 DIAGNOSIS — N18.30 CRF (CHRONIC RENAL FAILURE), STAGE 3 (MODERATE) (CMS/HCC): ICD-10-CM

## 2022-12-08 PROCEDURE — 93000 ELECTROCARDIOGRAM COMPLETE: CPT | Performed by: INTERNAL MEDICINE

## 2022-12-08 PROCEDURE — 99214 OFFICE O/P EST MOD 30 MIN: CPT | Performed by: INTERNAL MEDICINE

## 2022-12-08 PROCEDURE — 3008F BODY MASS INDEX DOCD: CPT | Performed by: INTERNAL MEDICINE

## 2022-12-08 RX ORDER — FUROSEMIDE 80 MG/1
80 TABLET ORAL DAILY
Qty: 90 TABLET | Refills: 3 | Status: SHIPPED | OUTPATIENT
Start: 2022-12-08 | End: 2023-06-06

## 2022-12-08 ASSESSMENT — ENCOUNTER SYMPTOMS
DYSPNEA ON EXERTION: 1
NEUROLOGICAL NEGATIVE: 1
ORTHOPNEA: 1
ENDOCRINE NEGATIVE: 1
WEIGHT GAIN: 1
RESPIRATORY NEGATIVE: 1
EYES NEGATIVE: 1
ALLERGIC/IMMUNOLOGIC NEGATIVE: 1
GASTROINTESTINAL NEGATIVE: 1
HEMATOLOGIC/LYMPHATIC NEGATIVE: 1
PSYCHIATRIC NEGATIVE: 1

## 2022-12-08 NOTE — PROGRESS NOTES
Chief Complaint: I saw Rinku in the office today on a routine visit he is lower extremity edema and his lymphedema is markedly improved and he is walking easier.  He denies chest discomfort or chest pressure chest tightness but has some exertional dyspnea he denies chest discomfort or shortness of breath with anxiety cold weather postprandially at rest or nocturnally.  He does get short of breath climbing stairs, he denies proximal nocturnal dyspnea he has orthopnea because of his postnasal drip and probably sleep apnea he denies syncope he still has lower extremity edema but it is much less, he denies palpitations he has nocturia and denies syncope.       Medications:  Current Outpatient Medications   Medication Sig Dispense Refill   • albuterol HFA (VENTOLIN HFA) 90 mcg/actuation inhaler inhale ONE TO TWO PUFFS EVERY 6 HOURS AS NEEDED FOR WHEEZING  0   • aspirin 81 mg enteric coated tablet TAKE ONE TABLET BY MOUTH DAILY 90 tablet 3   • DULoxetine (CYMBALTA) 60 mg capsule Take 1 capsule by mouth at bedtime.     • empagliflozin (JARDIANCE) 10 mg tablet Take 1 tablet (10 mg total) by mouth daily. 90 tablet 3   • furosemide (LASIX) 80 mg tablet Take 1 tablet (80 mg total) by mouth daily. 90 tablet 3   • lisinopriL (PRINIVIL) 40 mg tablet Take 1 tablet (40 mg total) by mouth once daily. 90 tablet 3   • metoprolol succinate XL (TOPROL-XL) 50 mg 24 hr tablet Take 50 mg by mouth daily.     • rosuvastatin (CRESTOR) 20 mg tablet Take 1 tablet (20 mg total) by mouth daily. 90 tablet 3   • spironolactone (ALDACTONE) 25 mg tablet Take 1 tablet (25 mg total) by mouth once daily. 90 tablet 3   • umeclidinium-vilanterol (ANORO ELLIPTA) 62.5-25 mcg/actuation blister with device Inhale 1 puff daily.       No current facility-administered medications for this visit.       Review of Systems:  Review of Systems   Constitutional: Positive for weight gain.   HENT: Negative.    Eyes: Negative.    Cardiovascular: Positive for dyspnea  on exertion, leg swelling and orthopnea.   Respiratory: Negative.    Endocrine: Negative.    Hematologic/Lymphatic: Negative.    Skin: Positive for rash.   Musculoskeletal: Positive for joint pain.   Gastrointestinal: Negative.    Genitourinary: Positive for nocturia.   Neurological: Negative.    Psychiatric/Behavioral: Negative.    Allergic/Immunologic: Negative.        Physical Exam:  Vitals:    12/08/22 1527   BP: 138/80   Pulse: 93   Resp: 16     Physical Exam  Constitutional:       Appearance: He is well-developed and well-nourished. He is obese.   HENT:      Head: Normocephalic and atraumatic.   Eyes:      Extraocular Movements: EOM normal.      Conjunctiva/sclera: Conjunctivae normal.      Pupils: Pupils are equal, round, and reactive to light.   Cardiovascular:      Rate and Rhythm: Normal rate and regular rhythm.      Pulses: Intact distal pulses.      Heart sounds: Normal heart sounds.      Comments: Sounds are distant  Pulmonary:      Effort: Pulmonary effort is normal.      Breath sounds: Normal breath sounds.   Abdominal:      General: Bowel sounds are normal.      Palpations: Abdomen is soft.   Musculoskeletal:         General: Normal range of motion.      Cervical back: Normal range of motion and neck supple.   Skin:     General: Skin is warm, dry and intact.   Neurological:      Mental Status: He is alert and oriented to person, place, and time.      Motor: Motor strength is normal.      Deep Tendon Reflexes: Reflexes are normal and symmetric.   Psychiatric:         Mood and Affect: Mood and affect normal.         Speech: Speech normal.         Behavior: Behavior normal.         Thought Content: Thought content normal.         Cognition and Memory: Cognition and memory normal.         Judgment: Judgment normal.       EKG: SR IVCD ST-Tabn    Assessment and Plan:  Impression:  Coronary artery disease status post stent to the right coronary artery.  Venous insufficiency and lymphedema  improving.  Cellulitis improved.  COPD.  Abnormal EKG.  History of SVT now sinus rhythm.  Morbid obesity.  Recommendation: I asked her to get blood work 3 months ago he still has not done it I gave him a slip to get a BMP and magnesium  level, he said he will get it tomorrow.  His legs are improving he is on and off a lot of Lasix 80 mg once a day so I am concerned about his renal status.  I will see him again in 3 months time I will call him with the blood work results.  If you have any questions feel free to contact me.  He really needs a CPAP machine I would get him reevaluated for sleep apnea if you can.    Alexandre Fernando, DO

## 2022-12-08 NOTE — LETTER
December 8, 2022     Hyacinth White  [address] [address]    Patient: Rinku Maier  YOB: 1965  Date of Visit: 12/8/2022      Dear Dr. White:    Thank you for referring Rinku Maier to me for evaluation. Below are my notes for this consultation.    If you have questions, please do not hesitate to call me. I look forward to following your patient along with you.         Sincerely,        Alexandre Fernando DO        CC: No Recipients  Alexandre Fernando DO  12/8/2022  4:27 PM  Signed      Chief Complaint: I saw Rinku in the office today on a routine visit he is lower extremity edema and his lymphedema is markedly improved and he is walking easier.  He denies chest discomfort or chest pressure chest tightness but has some exertional dyspnea he denies chest discomfort or shortness of breath with anxiety cold weather postprandially at rest or nocturnally.  He does get short of breath climbing stairs, he denies proximal nocturnal dyspnea he has orthopnea because of his postnasal drip and probably sleep apnea he denies syncope he still has lower extremity edema but it is much less, he denies palpitations he has nocturia and denies syncope.       Medications:  Current Outpatient Medications   Medication Sig Dispense Refill   • albuterol HFA (VENTOLIN HFA) 90 mcg/actuation inhaler inhale ONE TO TWO PUFFS EVERY 6 HOURS AS NEEDED FOR WHEEZING  0   • aspirin 81 mg enteric coated tablet TAKE ONE TABLET BY MOUTH DAILY 90 tablet 3   • DULoxetine (CYMBALTA) 60 mg capsule Take 1 capsule by mouth at bedtime.     • empagliflozin (JARDIANCE) 10 mg tablet Take 1 tablet (10 mg total) by mouth daily. 90 tablet 3   • furosemide (LASIX) 80 mg tablet Take 1 tablet (80 mg total) by mouth daily. 90 tablet 3   • lisinopriL (PRINIVIL) 40 mg tablet Take 1 tablet (40 mg total) by mouth once daily. 90 tablet 3   • metoprolol succinate XL (TOPROL-XL) 50 mg 24 hr tablet Take 50 mg by mouth daily.      • rosuvastatin (CRESTOR) 20 mg tablet Take 1 tablet (20 mg total) by mouth daily. 90 tablet 3   • spironolactone (ALDACTONE) 25 mg tablet Take 1 tablet (25 mg total) by mouth once daily. 90 tablet 3   • umeclidinium-vilanterol (ANORO ELLIPTA) 62.5-25 mcg/actuation blister with device Inhale 1 puff daily.       No current facility-administered medications for this visit.       Review of Systems:  Review of Systems   Constitutional: Positive for weight gain.   HENT: Negative.    Eyes: Negative.    Cardiovascular: Positive for dyspnea on exertion, leg swelling and orthopnea.   Respiratory: Negative.    Endocrine: Negative.    Hematologic/Lymphatic: Negative.    Skin: Positive for rash.   Musculoskeletal: Positive for joint pain.   Gastrointestinal: Negative.    Genitourinary: Positive for nocturia.   Neurological: Negative.    Psychiatric/Behavioral: Negative.    Allergic/Immunologic: Negative.        Physical Exam:  Vitals:    12/08/22 1527   BP: 138/80   Pulse: 93   Resp: 16     Physical Exam  Constitutional:       Appearance: He is well-developed and well-nourished. He is obese.   HENT:      Head: Normocephalic and atraumatic.   Eyes:      Extraocular Movements: EOM normal.      Conjunctiva/sclera: Conjunctivae normal.      Pupils: Pupils are equal, round, and reactive to light.   Cardiovascular:      Rate and Rhythm: Normal rate and regular rhythm.      Pulses: Intact distal pulses.      Heart sounds: Normal heart sounds.      Comments: Sounds are distant  Pulmonary:      Effort: Pulmonary effort is normal.      Breath sounds: Normal breath sounds.   Abdominal:      General: Bowel sounds are normal.      Palpations: Abdomen is soft.   Musculoskeletal:         General: Normal range of motion.      Cervical back: Normal range of motion and neck supple.   Skin:     General: Skin is warm, dry and intact.   Neurological:      Mental Status: He is alert and oriented to person, place, and time.      Motor: Motor  strength is normal.      Deep Tendon Reflexes: Reflexes are normal and symmetric.   Psychiatric:         Mood and Affect: Mood and affect normal.         Speech: Speech normal.         Behavior: Behavior normal.         Thought Content: Thought content normal.         Cognition and Memory: Cognition and memory normal.         Judgment: Judgment normal.       EKG: SR IVCD ST-Tabn    Assessment and Plan:  Impression:  Coronary artery disease status post stent to the right coronary artery.  Venous insufficiency and lymphedema improving.  Cellulitis improved.  COPD.  Abnormal EKG.  History of SVT now sinus rhythm.  Morbid obesity.  Recommendation: I asked her to get blood work 3 months ago he still has not done it I gave him a slip to get a BMP and magnesium  level, he said he will get it tomorrow.  His legs are improving he is on and off a lot of Lasix 80 mg once a day so I am concerned about his renal status.  I will see him again in 3 months time I will call him with the blood work results.  If you have any questions feel free to contact me.  He really needs a CPAP machine I would get him reevaluated for sleep apnea if you can.    Alexandre Fernando, DO

## 2023-01-28 ENCOUNTER — TELEPHONE (OUTPATIENT)
Dept: CARDIOLOGY | Facility: CLINIC | Age: 58
End: 2023-01-28
Payer: COMMERCIAL

## 2023-01-28 NOTE — TELEPHONE ENCOUNTER
Patient reports chest pain radiating to his left arm SOB and LE edema for the last 2 days. CP has been intermittent lasting for a couple hours each time, more frequent recently and more often than not. Recommended the patient go to the emergency department immediately to be evaluated for MI.

## 2023-02-02 ENCOUNTER — OFFICE VISIT (OUTPATIENT)
Dept: WOUND CARE | Facility: HOSPITAL | Age: 58
End: 2023-02-02
Attending: SURGERY
Payer: COMMERCIAL

## 2023-02-02 VITALS — RESPIRATION RATE: 20 BRPM | TEMPERATURE: 97 F | HEART RATE: 98 BPM

## 2023-02-02 DIAGNOSIS — R60.0 EDEMA OF BOTH LOWER EXTREMITIES: ICD-10-CM

## 2023-02-02 DIAGNOSIS — Z72.0 TOBACCO ABUSE: ICD-10-CM

## 2023-02-02 DIAGNOSIS — S81.802D OPEN WOUND OF BOTH LOWER EXTREMITIES WITH COMPLICATION, SUBSEQUENT ENCOUNTER: Primary | ICD-10-CM

## 2023-02-02 DIAGNOSIS — S81.801D OPEN WOUND OF BOTH LOWER EXTREMITIES WITH COMPLICATION, SUBSEQUENT ENCOUNTER: Primary | ICD-10-CM

## 2023-02-02 DIAGNOSIS — L90.9 ATROPHIC CONDITION OF SKIN: ICD-10-CM

## 2023-02-02 DIAGNOSIS — I87.309 CHRONIC PERIPHERAL VENOUS HYPERTENSION: ICD-10-CM

## 2023-02-02 DIAGNOSIS — L85.3 XEROSIS OF SKIN: ICD-10-CM

## 2023-02-02 PROCEDURE — 2W1RX6Z COMPRESSION OF LEFT LOWER LEG USING PRESSURE DRESSING: ICD-10-PCS | Performed by: SURGERY

## 2023-02-02 PROCEDURE — 27200104 HC MEDICOPASTE UNNA BOOT

## 2023-02-02 PROCEDURE — 27200112 HC AQUACELL SILVER 4X4

## 2023-02-02 PROCEDURE — 99213 OFFICE O/P EST LOW 20 MIN: CPT | Performed by: SURGERY

## 2023-02-02 PROCEDURE — 29580 STRAPPING UNNA BOOT: CPT | Mod: 50

## 2023-02-02 PROCEDURE — 29580 STRAPPING UNNA BOOT: CPT | Mod: 50 | Performed by: SURGERY

## 2023-02-02 PROCEDURE — 2W1QX6Z COMPRESSION OF RIGHT LOWER LEG USING PRESSURE DRESSING: ICD-10-PCS | Performed by: SURGERY

## 2023-02-02 ASSESSMENT — ENCOUNTER SYMPTOMS
FLANK PAIN: 0
BRUISES/BLEEDS EASILY: 0
COUGH: 0
ABDOMINAL DISTENTION: 0
WOUND: 1
JOINT SWELLING: 0
NERVOUS/ANXIOUS: 0
APPETITE CHANGE: 0
SHORTNESS OF BREATH: 0
POLYDIPSIA: 0
HEMATURIA: 0
EYE REDNESS: 0
RHINORRHEA: 0
FREQUENCY: 0
EYE DISCHARGE: 0
NUMBNESS: 0
CONFUSION: 0
WEAKNESS: 1
FEVER: 0
COLOR CHANGE: 0
FATIGUE: 1

## 2023-02-02 NOTE — PROGRESS NOTES
Patient ID: Rinku Maier                            : 1965  MRN: [id number]                                            Visit Date: 2023  Encounter Provider: INDERJIT Madrigal  Referring Provider: No ref. provider found    Subjective      HPI  Rinku is a 57 y.o. old male with a chief compliant of Chronic Venous Insufficiency w/ Ulceration (Bilateral leg venous ulcers)   presenting today for evaluation and management of a lower extremity wound.  Patient has not been seen for about 3 months now.  Previously discharged and instructed to obtain a pair of compression garments for his pharmacy.  Needs to be proactive in trying to minimize lower extremity swelling.  Saw his cardiologist, Dr. Alexandre Fernando, 2022 and his legs look pretty good.      The following have been reviewed and updated as appropriate in this visit:   Tobacco  Allergies  Meds  Problems  Med Hx  Surg Hx  Fam Hx       Past Medical History:  has a past medical history of Abnormal ECG, COPD (chronic obstructive pulmonary disease) (CMS/[facility]), MUELLER (dyspnea on exertion), Hypertension, Peripheral edema, and Sleep apnea.    He has no past medical history of Alcoholism (CMS/[facility]) or Substance abuse (CMS/[facility]).  Past Surgical History:  has a past surgical history that includes Hernia repair.  Social History:   Social History     Tobacco Use   • Smoking status: Every Day     Packs/day: 0.25     Types: Cigarettes     Last attempt to quit: 5/10/2019     Years since quitting: 3.7   • Smokeless tobacco: Never   • Tobacco comments:     former 2 packs a day smoker, currently taking chantix   Substance Use Topics   • Alcohol use: Yes     Comment: occasionally   • Drug use: Defer     Family History: family history includes Heart attack in his biological father; Stomach cancer in his biological mother and biological sister; Stroke in his biological father.  Medications:   Current Outpatient Medications:   •  albuterol HFA (VENTOLIN HFA) 90  mcg/actuation inhaler, inhale ONE TO TWO PUFFS EVERY 6 HOURS AS NEEDED FOR WHEEZING, Disp: , Rfl: 0  •  aspirin 81 mg enteric coated tablet, TAKE ONE TABLET BY MOUTH DAILY, Disp: 90 tablet, Rfl: 3  •  DULoxetine (CYMBALTA) 60 mg capsule, Take 1 capsule by mouth at bedtime., Disp: , Rfl:   •  empagliflozin (JARDIANCE) 10 mg tablet, Take 1 tablet (10 mg total) by mouth daily., Disp: 90 tablet, Rfl: 3  •  furosemide (LASIX) 80 mg tablet, Take 1 tablet (80 mg total) by mouth daily., Disp: 90 tablet, Rfl: 3  •  lisinopriL (PRINIVIL) 40 mg tablet, Take 1 tablet (40 mg total) by mouth once daily., Disp: 90 tablet, Rfl: 3  •  metoprolol succinate XL (TOPROL-XL) 50 mg 24 hr tablet, Take 50 mg by mouth daily., Disp: , Rfl:   •  rosuvastatin (CRESTOR) 20 mg tablet, Take 1 tablet (20 mg total) by mouth daily., Disp: 90 tablet, Rfl: 3  •  spironolactone (ALDACTONE) 25 mg tablet, Take 1 tablet (25 mg total) by mouth once daily., Disp: 90 tablet, Rfl: 3  •  umeclidinium-vilanterol (ANORO ELLIPTA) 62.5-25 mcg/actuation blister with device, Inhale 1 puff daily., Disp: , Rfl:     Allergies: has No Known Allergies.     Review of Systems   Constitutional: Positive for fatigue. Negative for appetite change and fever.   HENT: Negative for congestion and rhinorrhea.    Eyes: Negative for discharge and redness.   Respiratory: Negative for cough and shortness of breath.    Cardiovascular: Positive for leg swelling.   Gastrointestinal: Negative for abdominal distention.   Endocrine: Negative for cold intolerance, heat intolerance and polydipsia.   Genitourinary: Negative for flank pain, frequency and hematuria.   Musculoskeletal: Negative for gait problem and joint swelling.   Skin: Positive for wound. Negative for color change.   Allergic/Immunologic: Negative for immunocompromised state.   Neurological: Positive for weakness. Negative for numbness.   Hematological: Does not bruise/bleed easily.   Psychiatric/Behavioral: Negative for  confusion. The patient is not nervous/anxious.      Glucose, Serum   Date Value Ref Range Status   06/12/2019 102 (H) 65 - 99 mg/dL Final     Hemoglobin   Date Value Ref Range Status   06/12/2019 15.9 13.0 - 17.7 g/dL Final     Creatinine, Serum   Date Value Ref Range Status   06/12/2019 1.29 (H) 0.76 - 1.27 mg/dL Final   ]    Objective   Visit Vitals  Pulse 98   Temp 36.1 °C (97 °F)   Resp 20       Physical Exam  Vitals and nursing note reviewed.   Constitutional:       General: He is not in acute distress.     Appearance: Normal appearance.   HENT:      Head: Normocephalic and atraumatic.   Eyes:      Conjunctiva/sclera: Conjunctivae normal.      Pupils: Pupils are equal, round, and reactive to light.   Neck:      Trachea: Trachea normal.   Cardiovascular:      Rate and Rhythm: Regular rhythm.      Pulses: Intact distal pulses.   Pulmonary:      Effort: Pulmonary effort is normal. No respiratory distress.      Breath sounds: No wheezing.   Abdominal:      Palpations: Abdomen is soft.      Tenderness: There is no guarding.   Musculoskeletal:         General: Swelling present. No deformity.      Cervical back: Normal range of motion. No edema or erythema.      Right lower leg: Edema present.      Left lower leg: Edema present.        Legs:    Skin:     General: Skin is warm.      Capillary Refill: Capillary refill takes less than 2 seconds.   Neurological:      Mental Status: He is alert and oriented to person, place, and time. Mental status is at baseline.   Psychiatric:         Attention and Perception: He is attentive.         Mood and Affect: Affect is not inappropriate.         Speech: Speech normal.         Behavior: Behavior normal. Behavior is cooperative.         Cognition and Memory: Cognition and memory normal.                          Left posterior calf                                                 right posterior calf                                                                                               During visit patient received cleansing with gauze and saline to all wounds to assist in removing bioburden and loosely adhered slough    Assessment/Plan    Diagnosis Plan   1. Open wound of both lower extremities with complication, subsequent encounter        2. Xerosis of skin        3. Chronic peripheral venous hypertension        4. Edema of both lower extremities        5. Tobacco abuse        6. Atrophic condition of skin          Problem List Items Addressed This Visit     Tobacco abuse     The patient is made aware of the deleterious effects of smoking and nicotine on wound healing.  He has been provided resources within [facility] to assist him with smoking cessation.         Edema of both lower extremities     Rx: Knee-high gradient compression garments, 15/20 mmHg… Referred to [facility] pharmacy for fitting and acquisition    PLAN:  Maintain leg elevation above level of the heart as much as possible.  Restrict fluid intake to < 66oz/24 hours  Restrict salt intake to no more than 1839-3500 mgs/sodium/day  Minimize prolonged sitting and standing  Remain active to point of physical tolerance         Chronic peripheral venous hypertension     PLAN:  Bilateral Unna Boots applied by RN     Patient is given specific instructions regarding care of her Unna Boot.  It is essential that the patient keep legs elevated as much as possible, but short distance ambulation is encouraged and does increase efficacy of Unna Boot compression therapy.  The Unna Boot must be kept dry         Atrophic condition of skin     PLAN:  Eucerin Original Healing Cream to dry skin of legs daily.     It is recommended that you clean the wound using running warm water, a mild bath soap, and a soft wash cloth or bath sponge. Proper cleansing of your wound is essential for wound healing to take place. Inadequate cleansing leads to the build up of yellow or green material in the wound bed, providing overgrowth of  bacteria and noxious bacterial by products.         Open wound of both lower extremities with complication - Primary     PLAN:  Bilateral Unna Boots/ Aquacel Ag applied by RN     Patient is given specific instructions regarding care of her Unna Boot.  It is essential that the patient keep legs elevated as much as possible, but short distance ambulation is encouraged and does increase efficacy of Unna Boot compression therapy.  The Unna Boot must be kept dry           Xerosis of skin     PLAN:  I have recommended that ammonium lactate 12% lotion (Brand names: Lac-Hydrin, Am-Lactin, Moira-Lac) be applied to the dry skin of the legs and feet 1-2x daily.  These products work best when the patient is capable of washing the extremity using running warm water and a mild soap.  Use of a soft wash cloth or sponge is recommended to facilitate exfoliation of the dry skin.        This document was generated using speech recognition software. Please excuse any typographical errors.    Return to wound center in 1 week     INDERJIT Madrigal MD

## 2023-02-02 NOTE — PATIENT INSTRUCTIONS
COMPRESSION THERAPY     Unna Boot  Both Legs  An UNNA BOOT (compression wrap) has been applied today   Unna Boot is the brown wrap on your leg(s).  Do not remove or unwrap. Keep boot dry at all times; cover with seal-tight device or a cast over if showering.  A wet Unna boot should be removed as soon as possible to prevent infection and any damage to healthy skin.  If numbness, tingling or increased pain develops, notify the [facility] at [phone].        ACTIVITY     Elevate your legs above the level of your heart for specific time periods during the day on several firm pillows or a foam leg wedge  Use of a recliner chair at home can often help in the appropriate elevation of your legs above the level of your heart  Normal activity then rest and elevation  May walk

## 2023-02-02 NOTE — ASSESSMENT & PLAN NOTE
Rx: Knee-high gradient compression garments, 15/20 mmHg… Referred to [facility] for fitting and acquisition    PLAN:  Maintain leg elevation above level of the heart as much as possible.  Restrict fluid intake to < 66oz/24 hours  Restrict salt intake to no more than 0584-9684 mgs/sodium/day  Minimize prolonged sitting and standing  Remain active to point of physical tolerance

## 2023-02-02 NOTE — ASSESSMENT & PLAN NOTE
PLAN:  Bilateral Unna Boots/ Aquacel Ag applied by RN     Patient is given specific instructions regarding care of her Unna Boot.  It is essential that the patient keep legs elevated as much as possible, but short distance ambulation is encouraged and does increase efficacy of Unna Boot compression therapy.  The Unna Boot must be kept dry

## 2023-02-06 NOTE — TELEPHONE ENCOUNTER
Please see prior.  Agree with triage recommendation for   ED for L arm symptoms.  Looks like patient declined for now.

## 2023-02-09 ENCOUNTER — HOSPITAL ENCOUNTER (EMERGENCY)
Facility: HOSPITAL | Age: 58
Discharge: LEFT BEFORE DISCHARGE | End: 2023-02-09
Attending: EMERGENCY MEDICINE
Payer: COMMERCIAL

## 2023-02-09 ENCOUNTER — APPOINTMENT (EMERGENCY)
Dept: RADIOLOGY | Facility: HOSPITAL | Age: 58
End: 2023-02-09
Payer: COMMERCIAL

## 2023-02-09 VITALS
BODY MASS INDEX: 45.1 KG/M2 | OXYGEN SATURATION: 92 % | HEIGHT: 70 IN | RESPIRATION RATE: 22 BRPM | SYSTOLIC BLOOD PRESSURE: 147 MMHG | HEART RATE: 99 BPM | DIASTOLIC BLOOD PRESSURE: 70 MMHG | WEIGHT: 315 LBS | TEMPERATURE: 98.1 F

## 2023-02-09 DIAGNOSIS — J44.1 COPD EXACERBATION (CMS/HCC): Primary | ICD-10-CM

## 2023-02-09 DIAGNOSIS — J81.0 ACUTE PULMONARY EDEMA (CMS/HCC): ICD-10-CM

## 2023-02-09 DIAGNOSIS — J96.02 ACUTE RESPIRATORY FAILURE WITH HYPOXIA AND HYPERCARBIA (CMS/HCC): ICD-10-CM

## 2023-02-09 DIAGNOSIS — J96.01 ACUTE RESPIRATORY FAILURE WITH HYPOXIA AND HYPERCARBIA (CMS/HCC): ICD-10-CM

## 2023-02-09 LAB
ANION GAP SERPL CALC-SCNC: 7 MEQ/L (ref 3–15)
ATRIAL RATE: 108
BASE EXCESS BLDV CALC-SCNC: 5.6 MEQ/L
BASOPHILS # BLD: 0.03 K/UL (ref 0.01–0.1)
BASOPHILS NFR BLD: 0.3 %
BNP SERPL-MCNC: 15 PG/ML
BUN SERPL-MCNC: 17 MG/DL (ref 8–20)
CALCIUM SERPL-MCNC: 9 MG/DL (ref 8.9–10.3)
CHLORIDE SERPL-SCNC: 100 MEQ/L (ref 98–109)
CO2 BLDV-SCNC: 36.5 MEQ/L (ref 22–32)
CO2 SERPL-SCNC: 30 MEQ/L (ref 22–32)
CREAT SERPL-MCNC: 1.3 MG/DL (ref 0.8–1.3)
DIFFERENTIAL METHOD BLD: ABNORMAL
EOSINOPHIL # BLD: 0.2 K/UL (ref 0.04–0.54)
EOSINOPHIL NFR BLD: 2 %
ERYTHROCYTE [DISTWIDTH] IN BLOOD BY AUTOMATED COUNT: 14.9 % (ref 11.6–14.4)
FIO2 ON VENT: 100 %
FLUAV RNA SPEC QL NAA+PROBE: NEGATIVE
FLUBV RNA SPEC QL NAA+PROBE: NEGATIVE
GFR SERPL CREATININE-BSD FRML MDRD: 57.1 ML/MIN/1.73M*2
GLUCOSE SERPL-MCNC: 126 MG/DL (ref 70–99)
HCO3 BLDV-SCNC: 28.6 MEQ/L (ref 21–28)
HCT VFR BLDCO AUTO: 45.6 % (ref 40.1–51)
HGB BLD-MCNC: 14.1 G/DL (ref 13.7–17.5)
IMM GRANULOCYTES # BLD AUTO: 0.04 K/UL (ref 0–0.08)
IMM GRANULOCYTES NFR BLD AUTO: 0.4 %
INHALED O2 CONCENTRATION: ABNORMAL %
LACTATE SERPL-SCNC: 0.7 MMOL/L (ref 0.4–2)
LYMPHOCYTES # BLD: 2.1 K/UL (ref 1.2–3.5)
LYMPHOCYTES NFR BLD: 21.3 %
MCH RBC QN AUTO: 28.6 PG (ref 28–33.2)
MCHC RBC AUTO-ENTMCNC: 30.9 G/DL (ref 32.2–36.5)
MCV RBC AUTO: 92.5 FL (ref 83–98)
MONOCYTES # BLD: 1.01 K/UL (ref 0.3–1)
MONOCYTES NFR BLD: 10.3 %
NEUTROPHILS # BLD: 6.47 K/UL (ref 1.7–7)
NEUTS SEG NFR BLD: 65.7 %
NRBC BLD-RTO: 0 %
P AXIS: 68
PCO2 BLDV: 70 MM HG (ref 41–51)
PDW BLD AUTO: 9.4 FL (ref 9.4–12.4)
PH BLDV: 7.3 [PH] (ref 7.32–7.42)
PLATELET # BLD AUTO: 231 K/UL (ref 150–350)
PO2 BLDV: 49 MM HG (ref 25–40)
POTASSIUM SERPL-SCNC: 3.9 MEQ/L (ref 3.6–5.1)
PR INTERVAL: 160
QRS DURATION: 98
QT INTERVAL: 330
QTC CALCULATION(BAZETT): 442
R AXIS: 15
RBC # BLD AUTO: 4.93 M/UL (ref 4.5–5.8)
RSV RNA SPEC QL NAA+PROBE: NEGATIVE
SARS-COV-2 RNA RESP QL NAA+PROBE: POSITIVE
SODIUM SERPL-SCNC: 137 MEQ/L (ref 136–144)
T WAVE AXIS: 55
TROPONIN I SERPL HS-MCNC: 5 PG/ML
VENTRICULAR RATE: 108
WBC # BLD AUTO: 9.85 K/UL (ref 3.8–10.5)

## 2023-02-09 PROCEDURE — 63700000 HC SELF-ADMINISTRABLE DRUG: Performed by: EMERGENCY MEDICINE

## 2023-02-09 PROCEDURE — 3E0333Z INTRODUCTION OF ANTI-INFLAMMATORY INTO PERIPHERAL VEIN, PERCUTANEOUS APPROACH: ICD-10-PCS | Performed by: EMERGENCY MEDICINE

## 2023-02-09 PROCEDURE — 93005 ELECTROCARDIOGRAM TRACING: CPT | Performed by: EMERGENCY MEDICINE

## 2023-02-09 PROCEDURE — 3E033GC INTRODUCTION OF OTHER THERAPEUTIC SUBSTANCE INTO PERIPHERAL VEIN, PERCUTANEOUS APPROACH: ICD-10-PCS | Performed by: EMERGENCY MEDICINE

## 2023-02-09 PROCEDURE — 36415 COLL VENOUS BLD VENIPUNCTURE: CPT | Performed by: EMERGENCY MEDICINE

## 2023-02-09 PROCEDURE — 96374 THER/PROPH/DIAG INJ IV PUSH: CPT

## 2023-02-09 PROCEDURE — 84484 ASSAY OF TROPONIN QUANT: CPT | Performed by: EMERGENCY MEDICINE

## 2023-02-09 PROCEDURE — 71045 X-RAY EXAM CHEST 1 VIEW: CPT

## 2023-02-09 PROCEDURE — 63600000 HC DRUGS/DETAIL CODE: Performed by: EMERGENCY MEDICINE

## 2023-02-09 PROCEDURE — 83605 ASSAY OF LACTIC ACID: CPT | Performed by: EMERGENCY MEDICINE

## 2023-02-09 PROCEDURE — 96375 TX/PRO/DX INJ NEW DRUG ADDON: CPT

## 2023-02-09 PROCEDURE — 87637 SARSCOV2&INF A&B&RSV AMP PRB: CPT | Performed by: EMERGENCY MEDICINE

## 2023-02-09 PROCEDURE — 99284 EMERGENCY DEPT VISIT MOD MDM: CPT | Mod: 25

## 2023-02-09 PROCEDURE — 85025 COMPLETE CBC W/AUTO DIFF WBC: CPT | Performed by: EMERGENCY MEDICINE

## 2023-02-09 PROCEDURE — 82803 BLOOD GASES ANY COMBINATION: CPT | Performed by: EMERGENCY MEDICINE

## 2023-02-09 PROCEDURE — 93005 ELECTROCARDIOGRAM TRACING: CPT

## 2023-02-09 PROCEDURE — 93010 ELECTROCARDIOGRAM REPORT: CPT | Performed by: INTERNAL MEDICINE

## 2023-02-09 PROCEDURE — 83880 ASSAY OF NATRIURETIC PEPTIDE: CPT | Performed by: EMERGENCY MEDICINE

## 2023-02-09 PROCEDURE — 99283 EMERGENCY DEPT VISIT LOW MDM: CPT | Mod: 25

## 2023-02-09 PROCEDURE — 94644 CONT INHLJ TX 1ST HOUR: CPT

## 2023-02-09 PROCEDURE — 80048 BASIC METABOLIC PNL TOTAL CA: CPT | Performed by: EMERGENCY MEDICINE

## 2023-02-09 RX ORDER — PREDNISONE 10 MG/1
TABLET ORAL
Qty: 30 TABLET | Refills: 0 | Status: SHIPPED | OUTPATIENT
Start: 2023-02-09 | End: 2023-03-16 | Stop reason: ALTCHOICE

## 2023-02-09 RX ORDER — ALBUTEROL SULFATE 0.83 MG/ML
5 SOLUTION RESPIRATORY (INHALATION) ONCE
Status: DISCONTINUED | OUTPATIENT
Start: 2023-02-09 | End: 2023-02-09 | Stop reason: HOSPADM

## 2023-02-09 RX ORDER — FUROSEMIDE 10 MG/ML
80 INJECTION INTRAMUSCULAR; INTRAVENOUS ONCE
Status: COMPLETED | OUTPATIENT
Start: 2023-02-09 | End: 2023-02-09

## 2023-02-09 RX ORDER — AZITHROMYCIN 250 MG/1
500 TABLET, FILM COATED ORAL ONCE
Status: COMPLETED | OUTPATIENT
Start: 2023-02-09 | End: 2023-02-09

## 2023-02-09 RX ORDER — ALBUTEROL SULFATE 2.5 MG/.5ML
7.5 SOLUTION RESPIRATORY (INHALATION) ONCE
Status: COMPLETED | OUTPATIENT
Start: 2023-02-09 | End: 2023-02-09

## 2023-02-09 RX ORDER — ALBUTEROL SULFATE 90 UG/1
4 INHALANT RESPIRATORY (INHALATION) ONCE
Status: COMPLETED | OUTPATIENT
Start: 2023-02-09 | End: 2023-02-09

## 2023-02-09 RX ORDER — AZITHROMYCIN 250 MG/1
TABLET, FILM COATED ORAL
Qty: 4 TABLET | Refills: 0 | Status: SHIPPED | OUTPATIENT
Start: 2023-02-09 | End: 2023-03-16 | Stop reason: ALTCHOICE

## 2023-02-09 RX ADMIN — FUROSEMIDE 80 MG: 10 INJECTION, SOLUTION INTRAMUSCULAR; INTRAVENOUS at 18:56

## 2023-02-09 RX ADMIN — METHYLPREDNISOLONE SODIUM SUCCINATE 125 MG: 125 INJECTION, POWDER, FOR SOLUTION INTRAMUSCULAR; INTRAVENOUS at 18:03

## 2023-02-09 RX ADMIN — ALBUTEROL SULFATE 7.5 MG: 2.5 SOLUTION RESPIRATORY (INHALATION) at 17:54

## 2023-02-09 RX ADMIN — AZITHROMYCIN 500 MG: 250 TABLET, FILM COATED ORAL at 19:31

## 2023-02-09 RX ADMIN — ALBUTEROL SULFATE 4 PUFF: 90 AEROSOL, METERED RESPIRATORY (INHALATION) at 19:38

## 2023-02-09 ASSESSMENT — ENCOUNTER SYMPTOMS
VOMITING: 0
JOINT SWELLING: 0
EYE REDNESS: 0
HEMATURIA: 0
HEADACHES: 0
NUMBNESS: 1
SHORTNESS OF BREATH: 1
BLOOD IN STOOL: 0
COUGH: 1
FEVER: 0

## 2023-02-09 NOTE — ED PROVIDER NOTES
Emergency Medicine Note  HPI   HISTORY OF PRESENT ILLNESS     Dictation errors and typographical errors may be present in the document below.  Mariely in 20  RN hx/EHR reviewed  Hx obtained from pt    Patient has been feeling tired and out of breath for the past few days.  Reports that he feels that he has phlegm that he needs to cough up but will not come up.  No fever/chills.  He he denies any weight gain.  He is concerned about lower extremity swelling.  He went to his wound care appointment today and was sent to the ED due to shortness of breath and low oxygen saturation.  Patient does not use oxygen at home.  Yellow phlegm when able to cough up    Denies any salty food intake/medication noncompliance per  He is on a diuretic and compliant.        Shortness of Breath  Severity:  Moderate  Onset quality:  Gradual  Timing:  Constant  Progression:  Worsening  Relieved by:  Rest  Worsened by:  Exertion  Associated symptoms: cough    Associated symptoms: no chest pain, no fever, no headaches, no rash and no vomiting    Paresthesia  Associated symptoms: cough and shortness of breath    Associated symptoms: no chest pain, no fever, no headaches, no rash and no vomiting          Patient History   PAST HISTORY     Reviewed from Nursing Triage:       Past Medical History:   Diagnosis Date   • Abnormal ECG    • COPD (chronic obstructive pulmonary disease) (CMS/HCC)    • MUELLER (dyspnea on exertion)    • Hypertension    • Peripheral edema    • Sleep apnea        Past Surgical History:   Procedure Laterality Date   • HERNIA REPAIR         Family History   Problem Relation Age of Onset   • Stomach cancer Biological Mother    • Heart attack Biological Father    • Stroke Biological Father    • Stomach cancer Biological Sister        Social History     Tobacco Use   • Smoking status: Every Day     Packs/day: 0.25     Types: Cigarettes     Last attempt to quit: 5/10/2019     Years since quitting: 3.7   • Smokeless tobacco: Never   •  Tobacco comments:     former 2 packs a day smoker, currently taking chantix   Substance Use Topics   • Alcohol use: Yes     Comment: occasionally   • Drug use: Defer         Review of Systems   REVIEW OF SYSTEMS     Review of Systems   Constitutional: Negative for fever.   HENT: Negative for nosebleeds.    Eyes: Negative for redness.   Respiratory: Positive for cough and shortness of breath.    Cardiovascular: Negative for chest pain.   Gastrointestinal: Negative for blood in stool and vomiting.   Genitourinary: Negative for hematuria.   Musculoskeletal: Negative for joint swelling.   Skin: Negative for rash.   Neurological: Positive for numbness. Negative for headaches.   All other systems reviewed and are negative.        VITALS     ED Vitals    Date/Time Temp Pulse Resp BP SpO2 [facility]   02/09/23 1902 -- 99 -- 147/70 92 %    02/09/23 1856 -- 100 -- 138/88 -- LK   02/09/23 1803 -- 101 22 130/72 100 % EC   02/09/23 1650 -- 103 30 131/72 96 % LK   02/09/23 1509 -- -- -- -- 92 % EE   02/09/23 1507 36.7 °C (98.1 °F) 104 24 178/69 88 % EE        Pulse Ox %: 96 % (02/09/23 1707)  Pulse Ox Interpretation: Low (on supplemental oxygen via NC) (02/09/23 1707)  Heart Rate: 102 (02/09/23 1707)  Rhythm Strip Interpretation: Normal Sinus Rhythm (02/09/23 1707)     Physical Exam   PHYSICAL EXAM     Physical Exam  Vitals and nursing note reviewed.   Constitutional:       Appearance: He is well-developed. He is ill-appearing.   HENT:      Head: Normocephalic and atraumatic.      Right Ear: External ear normal.      Left Ear: External ear normal.   Eyes:      General: No scleral icterus.     Conjunctiva/sclera: Conjunctivae normal.   Neck:      Trachea: No tracheal deviation.   Cardiovascular:      Rate and Rhythm: Normal rate and regular rhythm.      Heart sounds: Normal heart sounds. No murmur heard.     Comments: 2+ radial b/l  Pulmonary:      Comments: Moderate aspiratory wheezing and prolonged expirations bilaterally.  There are  faint Rales in the bilateral lung bases.  Abdominal:      General: There is no distension.      Palpations: Abdomen is soft. There is no mass.      Tenderness: There is no abdominal tenderness. There is no guarding or rebound.   Musculoskeletal:         General: No tenderness. Normal range of motion.      Cervical back: Neck supple.      Comments: No calf assymetry;  No LE cords/tenderness   Skin:     General: Skin is warm and dry.   Neurological:      Mental Status: He is alert.      Comments: Communicates clearly;  Moves all extremities           PROCEDURES     Procedures     DATA     Results     Procedure Component Value Units Date/Time    HS Troponin I (with 2 hour reflex) [[id number]]  (Normal) Collected: 02/09/23 1718    Specimen: Blood, Venous Updated: 02/09/23 1907     High Sens Troponin I 5.0 pg/mL     SARS-CoV-2 (COVID-19), PCR Nasopharynx [[id number]] Collected: 02/09/23 1654    Specimen: Nasopharyngeal Swab from Nasopharynx Updated: 02/09/23 1903    Narrative:      The following orders were created for panel order SARS-CoV-2 (COVID-19), PCR Nasopharynx.  Procedure                               Abnormality         Status                     ---------                               -----------         ------                     SARS-COV-2 (COVID-19)/ F...[[id number]]                      In process                   Please view results for these tests on the individual orders.    SARS-COV-2 (COVID-19)/ FLU A/B, AND RSV, PCR Nasopharynx [[id number]] Collected: 02/09/23 1654    Specimen: Nasopharyngeal Swab from Nasopharynx Updated: 02/09/23 1903    Basic metabolic panel [[id number]]  (Abnormal) Collected: 02/09/23 1718    Specimen: Blood, Venous Updated: 02/09/23 1852     Sodium 137 mEQ/L      Potassium 3.9 mEQ/L      Comment: Results obtained on plasma. Plasma Potassium values may be up to 0.4 mEQ/L less than serum values. The differences may be greater for patients with high platelet or white cell counts.         Chloride 100 mEQ/L      CO2 30 mEQ/L      BUN 17 mg/dL      Creatinine 1.3 mg/dL      Glucose 126 mg/dL      Calcium 9.0 mg/dL      eGFR 57.1 mL/min/1.73m*2      Anion Gap 7 mEQ/L     VBG with Lactate [[id number]]  (Abnormal) Collected: 02/09/23 1747    Specimen: Blood, Venous Updated: 02/09/23 1842     pH, Venous 7.30     pCO2, Venous 70 mm Hg      pO2, Venous 49 mm Hg      HCO3, Venous 28.6 mEQ/L      Base Excess, Venous 5.6 mEQ/L      TCO2, Venous 36.5 mEQ/L      Lactate 0.7 mmol/L      Source Of Oxygen 2lnc     FIO2 100    CBC and differential [[id number]]  (Abnormal) Collected: 02/09/23 1718    Specimen: Blood, Venous Updated: 02/09/23 1819     WBC 9.85 K/uL      RBC 4.93 M/uL      Hemoglobin 14.1 g/dL      Hematocrit 45.6 %      MCV 92.5 fL      MCH 28.6 pg      MCHC 30.9 g/dL      RDW 14.9 %      Platelets 231 K/uL      MPV 9.4 fL      Differential Type Auto     nRBC 0.0 %      Immature Granulocytes 0.4 %      Neutrophils 65.7 %      Lymphocytes 21.3 %      Monocytes 10.3 %      Eosinophils 2.0 %      Basophils 0.3 %      Immature Granulocytes, Absolute 0.04 K/uL      Neutrophils, Absolute 6.47 K/uL      Lymphocytes, Absolute 2.10 K/uL      Monocytes, Absolute 1.01 K/uL      Eosinophils, Absolute 0.20 K/uL      Basophils, Absolute 0.03 K/uL     [place] Draw Panel [[id number]] Collected: 02/09/23 1718    Specimen: Blood, Venous Updated: 02/09/23 1815    Narrative:      The following orders were created for panel order [place] Draw Panel.  Procedure                               Abnormality         Status                     ---------                               -----------         ------                     RAINBOW LT BLUE[[id number]]                                  In process                   Please view results for these tests on the individual orders.    RAINBOW LT BLUE [[id number]] Collected: 02/09/23 1718    Specimen: Blood, Venous Updated: 02/09/23 1815    B-type natriuretic peptide [[id number]]  Collected: 02/09/23 1718    Specimen: Blood, Venous Updated: 02/09/23 1815          Imaging Results          X-RAY CHEST 1 VIEW (Final result)  Result time 02/09/23 17:16:42    Final result                 Impression:    IMPRESSION:    No acute infiltrate.             Narrative:    CLINICAL HISTORY: Shortness of breath    AP portable view of the chest.    COMPARISON: None available.    COMMENT:    The cardiac silhouette is prominent and accentuated by portable technique. The  cardiomediastinal silhouette is unremarkable. There is no acute infiltrate. The  visualized soft tissue and osseous structures are unremarkable.                                ECG 12 lead             Scoring tools                                  ED Course & MDM   MDM / ED COURSE / CLINICAL IMPRESSION / DISPO     MDM    ED Course as of 02/16/23 2007   Thu Feb 09, 2023   1707 EKG contemporaneously interpreted by me:  NSR, rate tachycardic,  non-specific st-tw changes, no stemi;   [JF]   1846 POC lung u/s  B+;  POC Lung u/s performed by me   Indication- eval for pathologic interstitial lung water  Findings-  Bilateral anterior and lateral lung fields evaluated by me;  B pattern POSITIVE  (+ = 2 distinct zones in each evaluated hemothorax w/ at least 3 B-lines in each interspace)  Impression- Findings consistent w/ pathologic increased interstitia lung water; Most likely due to pulm edema in this patient; (interstitial fibrosis, diffuse pneumonitis and ARDS can also have this appearance)      Pt w/ hypoxia;     Doubt pe;     Pt refusing dmission;   [JF]   1917 Refusing further w/u;  Friend picking him up in 15 min.    Will dc ama;   Pt has capacity;  Multiple attempts to convince him to let me tx him more      +++++++++++++++++++++++++++++++++++++++++++++++++++++++++++++++++  PROCEDURE NOTE  CRITICAL CARE NOTE:  -ACUTE PROCESS/ORGAN SYSTEM AT RISK- hypoxic/hypercarbic resp. failure  -I have personally provided 32 minutes of critical care time  exclusive of time spent on separately billable procedures. Time includes review of all data / discussion with consultants / admitting physicians, re-evaluation of patient, discussion with patient and/or family, review of all results, and monitoring for potential decompensation.   [JF]   1922 patient counseled regarding smoking cessation [JF]      ED Course User Index  [JF] Lino Antonio MD     Clinical Impression      None               Lino Antonio MD  02/16/23 2007

## 2023-02-10 ENCOUNTER — TELEPHONE (OUTPATIENT)
Dept: SCHEDULING | Facility: CLINIC | Age: 58
End: 2023-02-10
Payer: COMMERCIAL

## 2023-02-10 NOTE — TELEPHONE ENCOUNTER
Pt called, wants to speak with Dr. Fernando.    Wants to discuss recent ED visit at [facility] [place].    Pt can be reached at [phone]

## 2023-02-10 NOTE — TELEPHONE ENCOUNTER
"Spoke with Rinku    He was taken to [facility] ED yesterday from wound clinic for shortness of breath and low pulse ox.  Treatment team recommended admission for COPD exacerbation and possible pulmonary edema/CHF but he left AMA.    COVID +  VBG pH 7.30, CO2 70, O2 49, HCO3 28.6    Troponin -  BNP 15  CXR no acute infiltrate   POC ultrasound performed.   Patient states they saw \"food\" in his lungs.  Advised I cannot see result as was POC study but concern for pneumonia.    Was given IV lasix    Prescribed prednisone and azithromycin but has not yet picked up prescriptions.  I urged he or someone else go  his prescriptions ASAP.    Reviewed AVS available in EPIC which stated recommendation for admission.  Discharge instructions also stated take additional furosemide tonight.    He states he feels better today but I advised he return to the hospital anyway as this is likely due to treatment provided yesterday.  I further advised he should call 911 for recurrent shortness of breath, chest pain, or other concerning symptoms.    He also asked about the numbness he has felt on his face for two weeks.  I advised he go to the emergency department for evaluation as previously recommended by other providers.  This was not specifically addressed in the emergency department yesterday as he left.    Again advised he should return to ED for treatment.  "

## 2023-02-10 NOTE — DISCHARGE INSTRUCTIONS
As discussed, I strongly feel that you should be hospitalized and get further treatment for your medical emergency.  You have clear dangerous, possibly life-threatening problems that are actively going on with your breathing.  Your oxygen level is dangerously low, and you are not breathing well.    I suspect you have a combination of COPD exacerbation from an infection as well as pulmonary edema /congestive heart failure.    I recommend you take an extra dose of Lasix tomorrow night.    AS DISCUSSED I RECOMMENDED YOU STAY IN THE HOSPITAL FOR FURTHER EVALUATION OF YOUR breathing problems. DESPITE THIS RECOMMENDATION YOU HAVE DECIDED TO LEAVE. THIS IS YOUR RIGHT, EVEN IF I THINK IT IS A BAD CHOICE THAT COULD LEAD TO DISABILITY AND/OR DEATH.  RETURN ANYTIME IF YOU CHANGE YOUR MINE. WE'D BE HAPPY TO TAKE CARE OF YOU.      Use the albuterol, 2-4 puffs (or 1 nebulizer) every 4 hours for the next 24 hours.  Then every 4 hours as needed for shortness of breath or wheezing

## 2023-02-16 ENCOUNTER — OFFICE VISIT (OUTPATIENT)
Dept: WOUND CARE | Facility: HOSPITAL | Age: 58
End: 2023-02-16
Attending: SURGERY
Payer: COMMERCIAL

## 2023-02-16 VITALS — TEMPERATURE: 97.2 F | RESPIRATION RATE: 20 BRPM

## 2023-02-16 DIAGNOSIS — R60.0 EDEMA OF BOTH LOWER EXTREMITIES: ICD-10-CM

## 2023-02-16 DIAGNOSIS — I83.029 VENOUS ULCER OF LEFT LEG (CMS/HCC): Primary | ICD-10-CM

## 2023-02-16 DIAGNOSIS — L97.929 VENOUS ULCER OF LEFT LEG (CMS/HCC): Primary | ICD-10-CM

## 2023-02-16 DIAGNOSIS — L85.3 XEROSIS OF SKIN: ICD-10-CM

## 2023-02-16 DIAGNOSIS — Z72.0 TOBACCO ABUSE: ICD-10-CM

## 2023-02-16 DIAGNOSIS — I83.019 VENOUS ULCER OF RIGHT LEG (CMS/HCC): ICD-10-CM

## 2023-02-16 DIAGNOSIS — L90.9 ATROPHIC CONDITION OF SKIN: ICD-10-CM

## 2023-02-16 DIAGNOSIS — I87.309 CHRONIC PERIPHERAL VENOUS HYPERTENSION: ICD-10-CM

## 2023-02-16 DIAGNOSIS — L97.919 VENOUS ULCER OF RIGHT LEG (CMS/HCC): ICD-10-CM

## 2023-02-16 PROCEDURE — 29580 STRAPPING UNNA BOOT: CPT | Mod: LT

## 2023-02-16 PROCEDURE — 27200105 HC AQUA CELL 4X4

## 2023-02-16 PROCEDURE — 27200104 HC MEDICOPASTE UNNA BOOT

## 2023-02-16 PROCEDURE — 2W1RX6Z COMPRESSION OF LEFT LOWER LEG USING PRESSURE DRESSING: ICD-10-PCS | Performed by: SURGERY

## 2023-02-16 PROCEDURE — 99213 OFFICE O/P EST LOW 20 MIN: CPT | Performed by: SURGERY

## 2023-02-16 ASSESSMENT — ENCOUNTER SYMPTOMS
COUGH: 0
FREQUENCY: 0
EYE REDNESS: 0
APPETITE CHANGE: 0
SHORTNESS OF BREATH: 0
WEAKNESS: 1
BRUISES/BLEEDS EASILY: 0
FATIGUE: 1
EYE DISCHARGE: 0
NERVOUS/ANXIOUS: 0
CONFUSION: 0
FEVER: 0
HEMATURIA: 0
WOUND: 1
ABDOMINAL DISTENTION: 0
FLANK PAIN: 0
JOINT SWELLING: 0
COLOR CHANGE: 0
POLYDIPSIA: 0
NUMBNESS: 0
RHINORRHEA: 0

## 2023-02-16 NOTE — PATIENT INSTRUCTIONS
COMPRESSION THERAPY     Unna Boot  Left Leg  An UNNA BOOT (compression wrap) has been applied today   Unna Boot is the brown wrap on your leg(s).  Do not remove or unwrap. Keep boot dry at all times; cover with seal-tight device or a cast over if showering.  A wet Unna boot should be removed as soon as possible to prevent infection and any damage to healthy skin.  If numbness, tingling or increased pain develops, notify the [facility] at [phone].      Right leg apply a moisturizer daily and wear tubigrip stocking size E   COMPRESSION THERAPY     Tubigrip Stockings  Right Leg  size E  Apply Tubigrips (cotton/compression stockinette) DAILY.  Apply stockings upon arising in the morning to obtain the best results.  Remove stockings at bedtime once your legs are elevated.  Do not allow the stockinette to roll down as it can reduce or interrupt the blood flow in your limb.  Always wear the appropriate size that is recommended for you at the [facility].  Tubigrips can be washed by hand with soap and water and hang to dry overnight.     Elevate legs 2 -4 hours a day

## 2023-02-16 NOTE — ASSESSMENT & PLAN NOTE
Rx: Knee-high gradient compression garments, 15/20 mmHg… Referred to [facility] for fitting and acquisition    PLAN:  Maintain leg elevation above level of the heart as much as possible.  Restrict fluid intake to < 66oz/24 hours  Restrict salt intake to no more than 8335-9412 mgs/sodium/day  Minimize prolonged sitting and standing  Remain active to point of physical tolerance

## 2023-02-16 NOTE — ASSESSMENT & PLAN NOTE
PLAN:  Unna Boot/Aquacel Ag applied LLE by RN     Patient is given specific instructions regarding care of her Unna Boot.  It is essential that the patient keep legs elevated as much as possible, but short distance ambulation is encouraged and does increase efficacy of Unna Boot compression therapy.  The Unna Boot must be kept dry.

## 2023-02-23 ENCOUNTER — OFFICE VISIT (OUTPATIENT)
Dept: WOUND CARE | Facility: HOSPITAL | Age: 58
End: 2023-02-23
Attending: SURGERY
Payer: COMMERCIAL

## 2023-02-23 VITALS — HEART RATE: 108 BPM | TEMPERATURE: 97.9 F | RESPIRATION RATE: 20 BRPM

## 2023-02-23 DIAGNOSIS — Z72.0 TOBACCO ABUSE: ICD-10-CM

## 2023-02-23 DIAGNOSIS — L85.3 XEROSIS OF SKIN: ICD-10-CM

## 2023-02-23 DIAGNOSIS — R60.0 EDEMA OF BOTH LOWER EXTREMITIES: ICD-10-CM

## 2023-02-23 DIAGNOSIS — L97.929 VENOUS ULCER OF LEFT LEG (CMS/HCC): Primary | ICD-10-CM

## 2023-02-23 DIAGNOSIS — I87.309 CHRONIC PERIPHERAL VENOUS HYPERTENSION: ICD-10-CM

## 2023-02-23 DIAGNOSIS — I83.029 VENOUS ULCER OF LEFT LEG (CMS/HCC): Primary | ICD-10-CM

## 2023-02-23 PROCEDURE — 99213 OFFICE O/P EST LOW 20 MIN: CPT | Performed by: SURGERY

## 2023-02-23 PROCEDURE — G0463 HOSPITAL OUTPT CLINIC VISIT: HCPCS

## 2023-02-23 PROCEDURE — G0463 HOSPITAL OUTPT CLINIC VISIT: HCPCS | Performed by: SURGERY

## 2023-02-23 ASSESSMENT — ENCOUNTER SYMPTOMS
CONFUSION: 0
HEMATURIA: 0
ABDOMINAL DISTENTION: 0
JOINT SWELLING: 0
POLYDIPSIA: 0
FLANK PAIN: 0
FREQUENCY: 0
EYE DISCHARGE: 0
SHORTNESS OF BREATH: 0
FATIGUE: 1
NERVOUS/ANXIOUS: 0
FEVER: 0
WEAKNESS: 1
COUGH: 0
NUMBNESS: 0
WOUND: 1
BRUISES/BLEEDS EASILY: 0
APPETITE CHANGE: 0
EYE REDNESS: 0
COLOR CHANGE: 0
RHINORRHEA: 0

## 2023-02-23 NOTE — ASSESSMENT & PLAN NOTE
PLAN:    Patient is given specific instructions regarding care of her Unna Boot.  It is essential that the patient keep legs elevated as much as possible, but short distance ambulation is encouraged and does increase efficacy of Unna Boot compression therapy.  The Unna Boot must be kept dry

## 2023-02-23 NOTE — PATIENT INSTRUCTIONS
[facility] Instructions    MEDICATIONS     Medication Note  Continue present medications as prescribed by the [facility] or other physicians you see. To avoid any problems keep the [facility] informed each visit of any medications changes that occur.     WOUND CARE     Clean Wound with: Soap and Water and Showering   Treatment 1   Location: left leg  Dressing: apply silvadene to leg, cover with gauze, wrap with brit. Wear tubigrip stockings.  Dressing Care Frequency: Daily  Care Provider: Self       Basic Principles      Wash your hands thoroughly with soap and water and after each dressing change. If someone other than the patient changes the dressing, it’s best to wear disposable gloves.   Do not get the wound or dressing wet.   To shower: remove the dressing, shower with soap and water (including washing the wound - do not use a washcloth), air dry, then redress the wound.  Do not take a tub bath  Keep all your dressings in a clean, covered container at home to avoid dust and contamination.  Discard used dressings in a plastic bag or covered trash container.  Check your wound and the surrounding skin at each dressing change for redness, warmth, swelling, increased pain, foul odor, fever, pus or abnormal drainage or discharge.  Notify [facility] if any of these changes occur - [phone].        Nutrition  Eat a well, balanced diet with adequate protein to support wound healing.   Take a multivitamin every day. Adequate nutrition supports healing and new tissue growth.  All diabetic patients should strive to keep blood sugars within a normal, practical range.   Elevated blood sugars can delay your wound healing.        ACTIVITY     Elevate legs above level of heart   Elevate your legs above the level of your heart for specific time periods during the day on several firm pillows or a foam leg wedge  Use of a recliner chair at home can often help in the appropriate  elevation of your legs above the level of your heart  Normal activity then rest and elevation  May shower  May walk      COMPRESSION THERAPY     Tubigrip Stockings  Both legs  size E  Apply Tubigrips (cotton/compression stockinette) DAILY.  Apply stockings upon arising in the morning to obtain the best results.  Remove stockings at bedtime once your legs are elevated.  Do not allow the stockinette to roll down as it can reduce or interrupt the blood flow in your limb.  Always wear the appropriate size that is recommended for you at the [facility].  Tubigrips can be washed by hand with soap and water and hang to dry overnight.     Elevate legs 2 -4 hours a day

## 2023-02-23 NOTE — PROGRESS NOTES
OB PROGRESS NOTE    Postpartum Day 1: Vaginal Delivery    SUBJECTIVE  Feels well. Pain is well controlled with Tylenol and Ibuprofen.  She has no complaints.  She is urinating, tolerating a general diet and ambulating without difficulty.  Breast feeding is going well.  Lochia is moderate.  She denies emotional concerns.      EXAM  /66  Pulse 97  Temp 97.9  F (36.6  C) (Oral)  Resp 14  LMP 10/22/2016  SpO2 97%  Breastfeeding? Unknown    GENERAL APPEARANCE:  normal affect  ABDOMEN: soft, nontender, fundus firm below the umbilicus    Recent Results (from the past 2016 hour(s))   Group B strep PCR    Collection Time: 17 12:00 AM   Result Value Ref Range    Group B Strep PCR Positive    Group B strep PCR    Collection Time: 17  9:24 AM   Result Value Ref Range    Group B Strep PCR Negative    Rupture of membranes by Amnisure    Collection Time: 17  9:45 AM   Result Value Ref Range    Amnisure Positive (A) NEG   ABO and Rh    Collection Time: 17 11:55 AM   Result Value Ref Range    ABO B     RH(D)  Neg     Specimen Expires 2017    Rho (D) immune globulin (RhoGam) Lab Study    Collection Time: 17  5:22 PM   Result Value Ref Range    Rhogam Order Order received   See Rhogam Study/Suitability      ]    ASSESSMENT  Denia Hernandez is a 34 year old  PPD# 1 s/p  at 39+4 weeks gestation.  Doing well, uncomplicated course.    PLAN    1) Routine post-partum cares  2) Rh negative.  Rhogam is indicated as baby is Rh +  3) Pertussis vaccine to be given if indicated  4) Anticipate discharge tomorrow; discharge instructions and prescriptions written today      Ting Guevara MD  Obstetrics, Gynecology and Infertility   Patient ID: Rinku Maier                            : 1965  MRN: [id number]                                            Visit Date: 2023  Encounter Provider: INDERJIT Madrigal  Referring Provider: No ref. provider found    Subjective      HPI  Rinku is a 57 y.o. old male with a chief compliant of Chronic Venous Insufficiency w/ Ulceration (Left lateral calf)  presenting today for evaluation and management of a lower extremity wound.  Patient tolerated left-sided Unna boot over the past week without any issues.  He specifically does not want an Unna boot applied to either leg this week.  He would prefer to alternate weeks where Unna boots are applied.    The following have been reviewed and updated as appropriate in this visit:   Tobacco  Allergies  Meds  Problems  Med Hx  Surg Hx  Fam Hx       Past Medical History:  has a past medical history of Abnormal ECG, COPD (chronic obstructive pulmonary disease) (CMS/[facility]), MUELLER (dyspnea on exertion), Hypertension, Peripheral edema, and Sleep apnea.    He has no past medical history of Alcoholism (CMS/[facility]) or Substance abuse (CMS/[facility]).  Past Surgical History:  has a past surgical history that includes Hernia repair.  Social History:   Social History     Tobacco Use   • Smoking status: Every Day     Packs/day: 0.25     Types: Cigarettes     Last attempt to quit: 5/10/2019     Years since quitting: 3.7   • Smokeless tobacco: Never   • Tobacco comments:     former 2 packs a day smoker, currently taking chantix   Substance Use Topics   • Alcohol use: Yes     Comment: occasionally   • Drug use: Defer     Family History: family history includes Heart attack in his biological father; Stomach cancer in his biological mother and biological sister; Stroke in his biological father.  Medications:   Current Outpatient Medications:   •  albuterol HFA (VENTOLIN HFA) 90 mcg/actuation inhaler, inhale ONE TO TWO PUFFS EVERY 6 HOURS AS NEEDED FOR WHEEZING, Disp: ,  Rfl: 0  •  aspirin 81 mg enteric coated tablet, TAKE ONE TABLET BY MOUTH DAILY, Disp: 90 tablet, Rfl: 3  •  azithromycin (ZITHROMAX) 250 mg tablet, 1 tablet daily for 4 days., Disp: 4 tablet, Rfl: 0  •  DULoxetine (CYMBALTA) 60 mg capsule, Take 1 capsule by mouth at bedtime., Disp: , Rfl:   •  empagliflozin (JARDIANCE) 10 mg tablet, Take 1 tablet (10 mg total) by mouth daily., Disp: 90 tablet, Rfl: 3  •  furosemide (LASIX) 80 mg tablet, Take 1 tablet (80 mg total) by mouth daily., Disp: 90 tablet, Rfl: 3  •  lisinopriL (PRINIVIL) 40 mg tablet, Take 1 tablet (40 mg total) by mouth once daily., Disp: 90 tablet, Rfl: 3  •  metoprolol succinate XL (TOPROL-XL) 50 mg 24 hr tablet, Take 50 mg by mouth daily., Disp: , Rfl:   •  predniSONE (DELTASONE) 10 mg tablet, 4 tabs x3 days, 3 tabs x3 days, 2 tabs x3 days, 1 tab x3 days, Disp: 30 tablet, Rfl: 0  •  rosuvastatin (CRESTOR) 20 mg tablet, Take 1 tablet (20 mg total) by mouth daily., Disp: 90 tablet, Rfl: 3  •  spironolactone (ALDACTONE) 25 mg tablet, Take 1 tablet (25 mg total) by mouth once daily., Disp: 90 tablet, Rfl: 3  •  umeclidinium-vilanterol (ANORO ELLIPTA) 62.5-25 mcg/actuation blister with device, Inhale 1 puff daily., Disp: , Rfl:     Allergies: has No Known Allergies.     Review of Systems   Constitutional: Positive for fatigue. Negative for appetite change and fever.   HENT: Negative for congestion and rhinorrhea.    Eyes: Negative for discharge and redness.   Respiratory: Negative for cough and shortness of breath.    Cardiovascular: Positive for leg swelling.   Gastrointestinal: Negative for abdominal distention.   Endocrine: Negative for cold intolerance, heat intolerance and polydipsia.   Genitourinary: Negative for flank pain, frequency and hematuria.   Musculoskeletal: Negative for gait problem and joint swelling.   Skin: Positive for wound. Negative for color change.   Allergic/Immunologic: Negative for immunocompromised state.   Neurological: Positive  for weakness. Negative for numbness.   Hematological: Does not bruise/bleed easily.   Psychiatric/Behavioral: Negative for confusion. The patient is not nervous/anxious.      Glucose   Date Value Ref Range Status   02/09/2023 126 (H) 70 - 99 mg/dL Final     Hemoglobin   Date Value Ref Range Status   02/09/2023 14.1 13.7 - 17.5 g/dL Final     Creatinine   Date Value Ref Range Status   02/09/2023 1.3 0.8 - 1.3 mg/dL Final   ]    Objective   Visit Vitals  Pulse (!) 108   Temp 36.6 °C (97.9 °F)   Resp 20       Physical Exam  Vitals and nursing note reviewed.   Constitutional:       General: He is not in acute distress.     Appearance: Normal appearance.   HENT:      Head: Normocephalic and atraumatic.   Eyes:      Conjunctiva/sclera: Conjunctivae normal.      Pupils: Pupils are equal, round, and reactive to light.   Neck:      Trachea: Trachea normal.   Cardiovascular:      Rate and Rhythm: Regular rhythm.   Pulmonary:      Effort: Pulmonary effort is normal. No respiratory distress.      Breath sounds: No wheezing.   Abdominal:      Palpations: Abdomen is soft.      Tenderness: There is no guarding.   Musculoskeletal:         General: Swelling present. No deformity.      Cervical back: Normal range of motion. No edema or erythema.      Right lower leg: Edema present.      Left lower leg: Edema present.        Legs:    Skin:     Capillary Refill: Capillary refill takes less than 2 seconds.      Findings: Erythema present.   Neurological:      Mental Status: He is alert and oriented to person, place, and time. Mental status is at baseline.   Psychiatric:         Attention and Perception: He is attentive.         Mood and Affect: Affect is not inappropriate.         Speech: Speech normal.         Behavior: Behavior normal. Behavior is cooperative.           Left lateral calf                                                                   left posterior calf                                                                                               During visit patient received cleansing with gauze and saline to all wounds to assist in removing bioburden and loosely adhered slough    Assessment/Plan    Diagnosis Plan   1. Venous ulcer of left leg (CMS/HCC)        2. Xerosis of skin        3. Chronic peripheral venous hypertension        4. Edema of both lower extremities        5. Tobacco abuse          Problem List Items Addressed This Visit     Tobacco abuse     The patient is made aware of the deleterious effects of smoking and nicotine on wound healing.  He has been provided resources within the [facility] to assist him with smoking cessation.         Edema of both lower extremities     Rx: Knee-high gradient compression garments, 15/20 mmHg… Referred to [facility]'s pharmacy for fitting and acquisition    PLAN:  Maintain leg elevation above level of the heart as much as possible.  Restrict fluid intake to < 66oz/24 hours  Restrict salt intake to no more than 2895-3699 mgs/sodium/day  Minimize prolonged sitting and standing  Remain active to point of physical tolerance         Chronic peripheral venous hypertension     PLAN:    Patient is given specific instructions regarding care of her Unna Boot.  It is essential that the patient keep legs elevated as much as possible, but short distance ambulation is encouraged and does increase efficacy of Unna Boot compression therapy.  The Unna Boot must be kept dry         Xerosis of skin     PLAN:  I have recommended that ammonium lactate 12% lotion (Brand names: Lac-Hydrin, Am-Lactin, Moira-Lac) be applied to the dry skin of the legs and feet 1-2x daily.  These products work best when the patient is capable of washing the extremity using running warm water and a mild soap.  Use of a soft wash cloth or sponge is recommended to facilitate exfoliation of the dry skin.         Venous ulcer of left leg (CMS/HCC) - Primary     PLAN:  The patient requested an Unna boot not be applied  to the left leg this week.  He has Silvadene at home along with 4 x 4's and Brendon.  He also has assistance that can change dressings daily.  He is willing to wear bilateral Tubigrip stockings.  Patient wants to return to the wound center next week for a wound check.        This document was generated using speech recognition software. Please excuse any typographical errors.    Return to wound center in 1 week     INDERJIT Madrigal MD

## 2023-02-23 NOTE — ASSESSMENT & PLAN NOTE
PLAN:  The patient requested an Unna boot not be applied to the left leg this week.  He has Silvadene at home along with 4 x 4's and Brendon.  He also has assistance that can change dressings daily.  He is willing to wear bilateral Tubigrip stockings.  Patient wants to return to the wound center next week for a wound check.

## 2023-02-23 NOTE — ASSESSMENT & PLAN NOTE
Rx: Knee-high gradient compression garments, 15/20 mmHg… Referred to [facility] for fitting and acquisition    PLAN:  Maintain leg elevation above level of the heart as much as possible.  Restrict fluid intake to < 66oz/24 hours  Restrict salt intake to no more than 7326-3211 mgs/sodium/day  Minimize prolonged sitting and standing  Remain active to point of physical tolerance

## 2023-02-23 NOTE — TELEPHONE ENCOUNTER
VM left for patient.  Advised he spoke w/ RT on 2/10 regarding [facility] ED visit.  Please refer to telephone encounter from RT on 2/10 for details of discussion  Advised per ED AVS to call PCP to discuss ED results and schedule OV w/ Dr. Hyacinth White (PCP)   Provided Dr. White's office # [phone]  Advised to call PCP to discuss [facility] ED visit and schedule f/u OV w/ PCP asap.    Advised he can return call if further recommendations or concerns needed.  Thank you

## 2023-02-23 NOTE — TELEPHONE ENCOUNTER
Pt was recently at [facility] ED where blood was drawn and he is trying to find results. Pt was advised to reach out to pcp but asked that Dr Fernando call him back.    Pt can be reached at [phone]

## 2023-03-02 ENCOUNTER — OFFICE VISIT (OUTPATIENT)
Dept: WOUND CARE | Facility: HOSPITAL | Age: 58
End: 2023-03-02
Attending: SURGERY
Payer: COMMERCIAL

## 2023-03-02 VITALS
OXYGEN SATURATION: 94 % | WEIGHT: 315 LBS | RESPIRATION RATE: 18 BRPM | BODY MASS INDEX: 52.23 KG/M2 | HEART RATE: 109 BPM | TEMPERATURE: 98.4 F

## 2023-03-02 DIAGNOSIS — Z72.0 TOBACCO ABUSE: ICD-10-CM

## 2023-03-02 DIAGNOSIS — I83.029 VENOUS ULCER OF LEFT LEG (CMS/HCC): Primary | ICD-10-CM

## 2023-03-02 DIAGNOSIS — I87.309 CHRONIC PERIPHERAL VENOUS HYPERTENSION: ICD-10-CM

## 2023-03-02 DIAGNOSIS — L97.929 VENOUS ULCER OF LEFT LEG (CMS/HCC): Primary | ICD-10-CM

## 2023-03-02 DIAGNOSIS — R60.0 EDEMA OF BOTH LOWER EXTREMITIES: ICD-10-CM

## 2023-03-02 DIAGNOSIS — L85.3 XEROSIS OF SKIN: ICD-10-CM

## 2023-03-02 PROCEDURE — 99213 OFFICE O/P EST LOW 20 MIN: CPT | Performed by: SURGERY

## 2023-03-02 PROCEDURE — 27200104 HC MEDICOPASTE UNNA BOOT

## 2023-03-02 PROCEDURE — 2W1QX6Z COMPRESSION OF RIGHT LOWER LEG USING PRESSURE DRESSING: ICD-10-PCS | Performed by: SURGERY

## 2023-03-02 PROCEDURE — 3008F BODY MASS INDEX DOCD: CPT | Performed by: SURGERY

## 2023-03-02 PROCEDURE — 29580 STRAPPING UNNA BOOT: CPT | Mod: 50

## 2023-03-02 PROCEDURE — 2W1RX6Z COMPRESSION OF LEFT LOWER LEG USING PRESSURE DRESSING: ICD-10-PCS | Performed by: SURGERY

## 2023-03-02 PROCEDURE — 29580 STRAPPING UNNA BOOT: CPT | Mod: 50 | Performed by: SURGERY

## 2023-03-02 ASSESSMENT — ENCOUNTER SYMPTOMS
COLOR CHANGE: 0
FREQUENCY: 0
CONFUSION: 0
FEVER: 0
EYE DISCHARGE: 0
APPETITE CHANGE: 0
POLYDIPSIA: 0
RHINORRHEA: 0
FATIGUE: 1
COUGH: 0
WOUND: 1
JOINT SWELLING: 0
EYE REDNESS: 0
SHORTNESS OF BREATH: 0
NUMBNESS: 0
NERVOUS/ANXIOUS: 0
ABDOMINAL DISTENTION: 0
HEMATURIA: 0
BRUISES/BLEEDS EASILY: 0
FLANK PAIN: 0
WEAKNESS: 1

## 2023-03-02 NOTE — PROGRESS NOTES
Patient ID: Rinku Maier                            : 1965  MRN: [id number]                                            Visit Date: 3/2/2023  Encounter Provider: INDERJIT Madrigal  Referring Provider: No ref. provider found    Subjective      HPI  Rinku is a 57 y.o. old male with a chief compliant of Chronic Venous Insufficiency w/ Ulceration (BILATERAL LEGS)  presenting today for evaluation and management of a lower extremity wound.  The patient opted not to have an Unna boot placed on his left leg.  He wanted bilateral Tubigrips.  He [facility] to the [facility] today stating that both his legs hurt.  He does have some drainage from the posterior calf region.  He continues his self-destructive tendencies, smoking every day despite severe COPD and he continues to be dyspneic on exertion.    The following have been reviewed and updated as appropriate in this visit:   Tobacco  Allergies  Meds  Problems  Med Hx  Surg Hx  Fam Hx       Past Medical History:  has a past medical history of Abnormal ECG, COPD (chronic obstructive pulmonary disease) (CMS/[facility]), MUELLER (dyspnea on exertion), Hypertension, Peripheral edema, and Sleep apnea.    He has no past medical history of Alcoholism (CMS/[facility]) or Substance abuse (CMS/[facility]).  Past Surgical History:  has a past surgical history that includes Hernia repair.  Social History:   Social History     Tobacco Use   • Smoking status: Every Day     Packs/day: 0.25     Types: Cigarettes     Last attempt to quit: 5/10/2019     Years since quitting: 3.8   • Smokeless tobacco: Never   • Tobacco comments:     former 2 packs a day smoker, currently taking chantix   Substance Use Topics   • Alcohol use: Yes     Comment: occasionally   • Drug use: Defer     Family History: family history includes Heart attack in his biological father; Stomach cancer in his biological mother and biological sister; Stroke in his biological father.  Medications:   Current Outpatient  Medications:   •  albuterol HFA (VENTOLIN HFA) 90 mcg/actuation inhaler, inhale ONE TO TWO PUFFS EVERY 6 HOURS AS NEEDED FOR WHEEZING, Disp: , Rfl: 0  •  aspirin 81 mg enteric coated tablet, TAKE ONE TABLET BY MOUTH DAILY, Disp: 90 tablet, Rfl: 3  •  azithromycin (ZITHROMAX) 250 mg tablet, 1 tablet daily for 4 days., Disp: 4 tablet, Rfl: 0  •  DULoxetine (CYMBALTA) 60 mg capsule, Take 1 capsule by mouth at bedtime., Disp: , Rfl:   •  empagliflozin (JARDIANCE) 10 mg tablet, Take 1 tablet (10 mg total) by mouth daily., Disp: 90 tablet, Rfl: 3  •  furosemide (LASIX) 80 mg tablet, Take 1 tablet (80 mg total) by mouth daily., Disp: 90 tablet, Rfl: 3  •  lisinopriL (PRINIVIL) 40 mg tablet, Take 1 tablet (40 mg total) by mouth once daily., Disp: 90 tablet, Rfl: 3  •  metoprolol succinate XL (TOPROL-XL) 50 mg 24 hr tablet, Take 50 mg by mouth daily., Disp: , Rfl:   •  predniSONE (DELTASONE) 10 mg tablet, 4 tabs x3 days, 3 tabs x3 days, 2 tabs x3 days, 1 tab x3 days, Disp: 30 tablet, Rfl: 0  •  rosuvastatin (CRESTOR) 20 mg tablet, Take 1 tablet (20 mg total) by mouth daily., Disp: 90 tablet, Rfl: 3  •  spironolactone (ALDACTONE) 25 mg tablet, Take 1 tablet (25 mg total) by mouth once daily., Disp: 90 tablet, Rfl: 3  •  umeclidinium-vilanterol (ANORO ELLIPTA) 62.5-25 mcg/actuation blister with device, Inhale 1 puff daily., Disp: , Rfl:     Allergies: has No Known Allergies.     Review of Systems   Constitutional: Positive for fatigue. Negative for appetite change and fever.   HENT: Negative for congestion and rhinorrhea.    Eyes: Negative for discharge and redness.   Respiratory: Negative for cough and shortness of breath.    Cardiovascular: Positive for leg swelling.   Gastrointestinal: Negative for abdominal distention.   Endocrine: Negative for cold intolerance, heat intolerance and polydipsia.   Genitourinary: Negative for flank pain, frequency and hematuria.   Musculoskeletal: Negative for gait problem and joint  swelling.   Skin: Positive for wound. Negative for color change.   Allergic/Immunologic: Negative for immunocompromised state.   Neurological: Positive for weakness. Negative for numbness.   Hematological: Does not bruise/bleed easily.   Psychiatric/Behavioral: Negative for confusion. The patient is not nervous/anxious.      Glucose   Date Value Ref Range Status   02/09/2023 126 (H) 70 - 99 mg/dL Final     Hemoglobin   Date Value Ref Range Status   02/09/2023 14.1 13.7 - 17.5 g/dL Final     Creatinine   Date Value Ref Range Status   02/09/2023 1.3 0.8 - 1.3 mg/dL Final   ]    Objective   Visit Vitals  Pulse (!) 109   Temp 36.9 °C (98.4 °F)   Resp 18       Physical Exam  Vitals and nursing note reviewed.   Constitutional:       General: He is not in acute distress.     Appearance: Normal appearance.   HENT:      Head: Normocephalic and atraumatic.   Eyes:      Conjunctiva/sclera: Conjunctivae normal.      Pupils: Pupils are equal, round, and reactive to light.   Neck:      Trachea: Trachea normal.   Cardiovascular:      Rate and Rhythm: Regular rhythm.   Pulmonary:      Effort: Pulmonary effort is normal. No respiratory distress.      Breath sounds: No wheezing.   Abdominal:      Palpations: Abdomen is soft.      Tenderness: There is no guarding.   Musculoskeletal:         General: Swelling present. No deformity.      Cervical back: Normal range of motion. No edema or erythema.      Right lower leg: Edema present.      Left lower leg: Edema present.        Legs:       Comments: Very truncal obesity which does interfere with respiratory cycle.   Skin:     General: Skin is warm.      Capillary Refill: Capillary refill takes less than 2 seconds.   Neurological:      Mental Status: He is alert and oriented to person, place, and time. Mental status is at baseline.   Psychiatric:         Attention and Perception: He is attentive.         Mood and Affect: Affect is not inappropriate.         Speech: Speech normal.          Behavior: Behavior normal. Behavior is cooperative.                                Right leg                                                 left posterior calf                                                                                       During visit patient received cleansing with gauze and saline to all wounds to assist in removing bioburden and loosely adhered slough    Assessment/Plan    Diagnosis Plan   1. Venous ulcer of left leg (CMS/HCC)        2. Xerosis of skin        3. Chronic peripheral venous hypertension        4. Edema of both lower extremities        5. Tobacco abuse          Problem List Items Addressed This Visit     Tobacco abuse     The patient is made aware of the deleterious effects of smoking and nicotine on wound healing.  [facility] provided [facility] to assist him with smoking cessation.         Edema of both lower extremities     Rx: Knee-high gradient compression garments, 15/20 mmHg… Referred to [facility]'s pharmacy for fitting and acquisition    PLAN:  Maintain leg elevation above level of the heart as much as possible.  Restrict fluid intake to < 66oz/24 hours  Restrict salt intake to no more than 2887-1589 mgs/sodium/day  Minimize prolonged sitting and standing  Remain active to point of physical tolerance         Chronic peripheral venous hypertension     PLAN:    Patient is given specific instructions regarding care of her Unna Boot.  It is essential that the patient keep legs elevated as much as possible, but short distance ambulation is encouraged and does increase efficacy of Unna Boot compression therapy.  The Unna Boot must be kept dry         Xerosis of skin     PLAN:  I have recommended that ammonium lactate 12% lotion (Brand names: Lac-Hydrin, Am-Lactin, Moira-Lac) be applied to the dry skin of the legs and feet 1-2x daily.  These products work best when the patient is capable of washing the extremity using running warm water and a  mild soap.  Use of a soft wash cloth or sponge is recommended to facilitate exfoliation of the dry skin.         Venous ulcer of left leg (CMS/HCC) - Primary     PLAN:  Unna Boot applied LLE by RN     Patient is given specific instructions regarding care of her Unna Boot.  It is essential that the patient keep legs elevated as much as possible, but short distance ambulation is encouraged and does increase efficacy of Unna Boot compression therapy.  The Unna Boot must be kept dry.    Continuing on the path he is taking it will be just a matter of time until he ends up being admitted with respiratory failure or decompensated heart failure.        This document was generated using speech recognition software. Please excuse any typographical errors.    Return to wound center in 1 week     INDERJIT Bueno. MD Rohan

## 2023-03-02 NOTE — ASSESSMENT & PLAN NOTE
PLAN:  Unna Boot applied LLE by RN     Patient is given specific instructions regarding care of her Unna Boot.  It is essential that the patient keep legs elevated as much as possible, but short distance ambulation is encouraged and does increase efficacy of Unna Boot compression therapy.  The Unna Boot must be kept dry.    Continuing on the path he is taking it will be just a matter of time until he ends up being admitted with respiratory failure or decompensated heart failure.

## 2023-03-02 NOTE — ASSESSMENT & PLAN NOTE
Rx: Knee-high gradient compression garments, 15/20 mmHg… Referred to [facility] for fitting and acquisition    PLAN:  Maintain leg elevation above level of the heart as much as possible.  Restrict fluid intake to < 66oz/24 hours  Restrict salt intake to no more than 0032-9114 mgs/sodium/day  Minimize prolonged sitting and standing  Remain active to point of physical tolerance

## 2023-03-09 ENCOUNTER — OFFICE VISIT (OUTPATIENT)
Dept: WOUND CARE | Facility: HOSPITAL | Age: 58
End: 2023-03-09
Attending: SURGERY
Payer: COMMERCIAL

## 2023-03-09 VITALS — TEMPERATURE: 97.6 F | HEART RATE: 110 BPM | RESPIRATION RATE: 20 BRPM

## 2023-03-09 DIAGNOSIS — R60.0 EDEMA OF BOTH LOWER EXTREMITIES: ICD-10-CM

## 2023-03-09 DIAGNOSIS — I87.309 CHRONIC PERIPHERAL VENOUS HYPERTENSION: ICD-10-CM

## 2023-03-09 DIAGNOSIS — L85.3 XEROSIS OF SKIN: ICD-10-CM

## 2023-03-09 DIAGNOSIS — I83.029 VENOUS ULCER OF LEFT LEG (CMS/HCC): Primary | ICD-10-CM

## 2023-03-09 DIAGNOSIS — Z72.0 TOBACCO ABUSE: ICD-10-CM

## 2023-03-09 DIAGNOSIS — L97.929 VENOUS ULCER OF LEFT LEG (CMS/HCC): Primary | ICD-10-CM

## 2023-03-09 PROCEDURE — 2W1RX6Z COMPRESSION OF LEFT LOWER LEG USING PRESSURE DRESSING: ICD-10-PCS | Performed by: SURGERY

## 2023-03-09 PROCEDURE — 27200104 HC MEDICOPASTE UNNA BOOT

## 2023-03-09 PROCEDURE — 27200105 HC AQUA CELL 4X4

## 2023-03-09 PROCEDURE — 29580 STRAPPING UNNA BOOT: CPT

## 2023-03-09 PROCEDURE — 29580 STRAPPING UNNA BOOT: CPT | Mod: LT | Performed by: SURGERY

## 2023-03-09 PROCEDURE — 99213 OFFICE O/P EST LOW 20 MIN: CPT | Performed by: SURGERY

## 2023-03-09 ASSESSMENT — ENCOUNTER SYMPTOMS
ABDOMINAL DISTENTION: 0
COLOR CHANGE: 0
NERVOUS/ANXIOUS: 0
RHINORRHEA: 0
FLANK PAIN: 0
WEAKNESS: 1
FATIGUE: 1
APPETITE CHANGE: 0
EYE REDNESS: 0
POLYDIPSIA: 0
WOUND: 1
SHORTNESS OF BREATH: 1
BRUISES/BLEEDS EASILY: 0
CONFUSION: 0
FEVER: 0
NUMBNESS: 0
COUGH: 0
FREQUENCY: 0
EYE DISCHARGE: 0
JOINT SWELLING: 0
HEMATURIA: 0

## 2023-03-09 NOTE — PROGRESS NOTES
Patient ID: Rinku Maier                            : 1965  MRN: [id number]                                            Visit Date: 3/9/2023  Encounter Provider: INDERJIT Madrigal  Referring Provider: No ref. provider found    Subjective      HPI  Rinku is a 57 y.o. old male with a chief compliant of Chronic Venous Insufficiency w/ Ulceration  presenting today for evaluation and management of a lower extremity wound.  Patient tolerated left-sided Unna boot compression over the past week.  He is noticeably short of breath and dyspneic walking from the waiting room back to the exam room.  Continues to smoke heavily.  Patient had to take his Unna boot for couple days ago.  Defecated in his pants and soiled the Unna boot.    The following have been reviewed and updated as appropriate in this visit:   Tobacco  Allergies  Meds  Problems  Med Hx  Surg Hx  Fam Hx       Past Medical History:  has a past medical history of Abnormal ECG, COPD (chronic obstructive pulmonary disease) (CMS/[facility]), MUELLER (dyspnea on exertion), Hypertension, Peripheral edema, and Sleep apnea.    He has no past medical history of Alcoholism (CMS/[facility]) or Substance abuse (CMS/[facility]).  Past Surgical History:  has a past surgical history that includes Hernia repair.  Social History:   Social History     Tobacco Use   • Smoking status: Every Day     Packs/day: 0.25     Types: Cigarettes     Last attempt to quit: 5/10/2019     Years since quitting: 3.8   • Smokeless tobacco: Never   • Tobacco comments:     former 2 packs a day smoker, currently taking chantix   Substance Use Topics   • Alcohol use: Yes     Comment: occasionally   • Drug use: Defer     Family History: family history includes Heart attack in his biological father; Stomach cancer in his biological mother and biological sister; Stroke in his biological father.  Medications:   Current Outpatient Medications:   •  albuterol HFA (VENTOLIN HFA) 90 mcg/actuation inhaler, inhale  ONE TO TWO PUFFS EVERY 6 HOURS AS NEEDED FOR WHEEZING, Disp: , Rfl: 0  •  aspirin 81 mg enteric coated tablet, TAKE ONE TABLET BY MOUTH DAILY, Disp: 90 tablet, Rfl: 3  •  azithromycin (ZITHROMAX) 250 mg tablet, 1 tablet daily for 4 days., Disp: 4 tablet, Rfl: 0  •  DULoxetine (CYMBALTA) 60 mg capsule, Take 1 capsule by mouth at bedtime., Disp: , Rfl:   •  empagliflozin (JARDIANCE) 10 mg tablet, Take 1 tablet (10 mg total) by mouth daily., Disp: 90 tablet, Rfl: 3  •  furosemide (LASIX) 80 mg tablet, Take 1 tablet (80 mg total) by mouth daily., Disp: 90 tablet, Rfl: 3  •  lisinopriL (PRINIVIL) 40 mg tablet, Take 1 tablet (40 mg total) by mouth once daily., Disp: 90 tablet, Rfl: 3  •  metoprolol succinate XL (TOPROL-XL) 50 mg 24 hr tablet, Take 50 mg by mouth daily., Disp: , Rfl:   •  predniSONE (DELTASONE) 10 mg tablet, 4 tabs x3 days, 3 tabs x3 days, 2 tabs x3 days, 1 tab x3 days, Disp: 30 tablet, Rfl: 0  •  rosuvastatin (CRESTOR) 20 mg tablet, Take 1 tablet (20 mg total) by mouth daily., Disp: 90 tablet, Rfl: 3  •  spironolactone (ALDACTONE) 25 mg tablet, Take 1 tablet (25 mg total) by mouth once daily., Disp: 90 tablet, Rfl: 3  •  umeclidinium-vilanterol (ANORO ELLIPTA) 62.5-25 mcg/actuation blister with device, Inhale 1 puff daily., Disp: , Rfl:     Allergies: has No Known Allergies.     Review of Systems   Constitutional: Positive for fatigue. Negative for appetite change and fever.   HENT: Negative for congestion and rhinorrhea.    Eyes: Negative for discharge and redness.   Respiratory: Positive for shortness of breath. Negative for cough.    Cardiovascular: Positive for leg swelling.   Gastrointestinal: Negative for abdominal distention.   Endocrine: Negative for cold intolerance, heat intolerance and polydipsia.   Genitourinary: Negative for flank pain, frequency and hematuria.   Musculoskeletal: Negative for gait problem and joint swelling.   Skin: Positive for wound. Negative for color change.    Allergic/Immunologic: Negative for immunocompromised state.   Neurological: Positive for weakness. Negative for numbness.   Hematological: Does not bruise/bleed easily.   Psychiatric/Behavioral: Negative for confusion. The patient is not nervous/anxious.      Glucose   Date Value Ref Range Status   02/09/2023 126 (H) 70 - 99 mg/dL Final     Hemoglobin   Date Value Ref Range Status   02/09/2023 14.1 13.7 - 17.5 g/dL Final     Creatinine   Date Value Ref Range Status   02/09/2023 1.3 0.8 - 1.3 mg/dL Final   ]    Objective   Visit Vitals  Pulse (!) 110   Temp 36.4 °C (97.6 °F)   Resp 20       Physical Exam  Vitals and nursing note reviewed.   Constitutional:       General: He is not in acute distress.     Appearance: Normal appearance.   HENT:      Head: Normocephalic and atraumatic.   Eyes:      Conjunctiva/sclera: Conjunctivae normal.      Pupils: Pupils are equal, round, and reactive to light.   Neck:      Trachea: Trachea normal.   Cardiovascular:      Rate and Rhythm: Regular rhythm.   Pulmonary:      Effort: Pulmonary effort is normal. No respiratory distress.      Breath sounds: No wheezing.   Abdominal:      Palpations: Abdomen is soft.      Tenderness: There is no guarding.   Musculoskeletal:         General: Swelling present. No deformity.      Cervical back: Normal range of motion. No edema or erythema.      Right lower leg: Edema present.      Left lower leg: Edema present.        Legs:    Skin:     General: Skin is warm.      Capillary Refill: Capillary refill takes less than 2 seconds.   Neurological:      Mental Status: He is alert and oriented to person, place, and time. Mental status is at baseline.   Psychiatric:         Attention and Perception: He is attentive.         Mood and Affect: Affect is not inappropriate.         Speech: Speech normal.         Behavior: Behavior normal. Behavior is cooperative.                                                       Venous type ulcer left posterior  calf                                                                                    During visit patient received cleansing with gauze and saline to all wounds to assist in removing bioburden and loosely adhered slough    Assessment/Plan    Diagnosis Plan   1. Venous ulcer of left leg (CMS/HCC)        2. Xerosis of skin        3. Chronic peripheral venous hypertension        4. Edema of both lower extremities        5. Tobacco abuse          Problem List Items Addressed This Visit     Tobacco abuse     The patient is made aware of the deleterious effects of smoking and nicotine on wound healing.  He has been provided [facility] to assist him with smoking cessation.         Edema of both lower extremities     Rx: Knee-high gradient compression garments, 15/20 mmHg… Referred to [facility]'s pharmacy for fitting and acquisition    PLAN:  Maintain leg elevation above level of the heart as much as possible.  Restrict fluid intake to < 66oz/24 hours  Restrict salt intake to no more than 4403-3710 mgs/sodium/day  Minimize prolonged sitting and standing  Remain active to point of physical tolerance         Chronic peripheral venous hypertension     PLAN:    Patient is given specific instructions regarding care of her Unna Boot.  It is essential that the patient keep legs elevated as much as possible, but short distance ambulation is encouraged and does increase efficacy of Unna Boot compression therapy.  The Unna Boot must be kept dry         Xerosis of skin     PLAN:  I have recommended that ammonium lactate 12% lotion (Brand names: Lac-Hydrin, Am-Lactin, Moira-Lac) be applied to the dry skin of the legs and feet 1-2x daily.  These products work best when the patient is capable of washing the extremity using running warm water and a mild soap.  Use of a soft wash cloth or sponge is recommended to facilitate exfoliation of the dry skin.         Venous ulcer of left leg (CMS/HCC) - Primary      PLAN:  Unna Boot/Aquacel applied LLE by RN     Patient is given specific instructions regarding care of her Unna Boot.  It is essential that the patient keep legs elevated as much as possible, but short distance ambulation is encouraged and does increase efficacy of Unna Boot compression therapy.  The Unna Boot must be kept dry.    Continuing on the path he is taking it will be just a matter of time until he ends up being admitted with respiratory failure or decompensated heart failure.        This document was generated using speech recognition software. Please excuse any typographical errors.    Return to wound center in 1 week     INDERJIT Bueno. MD Rohan

## 2023-03-09 NOTE — ASSESSMENT & PLAN NOTE
PLAN:  Unna Boot/Aquacel applied LLE by RN     Patient is given specific instructions regarding care of her Unna Boot.  It is essential that the patient keep legs elevated as much as possible, but short distance ambulation is encouraged and does increase efficacy of Unna Boot compression therapy.  The Unna Boot must be kept dry.    Continuing on the path he is taking it will be just a matter of time until he ends up being admitted with respiratory failure or decompensated heart failure.

## 2023-03-09 NOTE — PATIENT INSTRUCTIONS
COMPRESSION THERAPY     Unna Boot  Left Leg  An UNNA BOOT (compression wrap) has been applied today   Unna Boot is the brown wrap on your leg(s).  Do not remove or unwrap. Keep boot dry at all times; cover with seal-tight device or a cast over if showering.  A wet Unna boot should be removed as soon as possible to prevent infection and any damage to healthy skin.  If numbness, tingling or increased pain develops, notify the [facility] at [phone].      Right leg apply a moisturizer daily and wear tubigrip stocking size E   COMPRESSION THERAPY     Tubigrip Stockings  Right Leg  size F  Apply Tubigrips (cotton/compression stockinette) DAILY.  Apply stockings upon arising in the morning to obtain the best results.  Remove stockings at bedtime once your legs are elevated.  Do not allow the stockinette to roll down as it can reduce or interrupt the blood flow in your limb.  Always wear the appropriate size that is recommended for you at the [facility].  Tubigrips can be washed by hand with soap and water and hang to dry overnight.     Elevate legs 2 -4 hours a day

## 2023-03-09 NOTE — ASSESSMENT & PLAN NOTE
Rx: Knee-high gradient compression garments, 15/20 mmHg… Referred to [facility] for fitting and acquisition    PLAN:  Maintain leg elevation above level of the heart as much as possible.  Restrict fluid intake to < 66oz/24 hours  Restrict salt intake to no more than 9131-1593 mgs/sodium/day  Minimize prolonged sitting and standing  Remain active to point of physical tolerance

## 2023-03-16 ENCOUNTER — OFFICE VISIT (OUTPATIENT)
Dept: CARDIOLOGY | Facility: CLINIC | Age: 58
End: 2023-03-16
Payer: COMMERCIAL

## 2023-03-16 VITALS
OXYGEN SATURATION: 90 % | BODY MASS INDEX: 45.1 KG/M2 | SYSTOLIC BLOOD PRESSURE: 128 MMHG | HEART RATE: 103 BPM | HEIGHT: 70 IN | RESPIRATION RATE: 20 BRPM | WEIGHT: 315 LBS | DIASTOLIC BLOOD PRESSURE: 60 MMHG

## 2023-03-16 DIAGNOSIS — I47.10 SVT (SUPRAVENTRICULAR TACHYCARDIA) (CMS/HCC): Primary | ICD-10-CM

## 2023-03-16 PROCEDURE — 93000 ELECTROCARDIOGRAM COMPLETE: CPT | Performed by: INTERNAL MEDICINE

## 2023-03-16 PROCEDURE — 3008F BODY MASS INDEX DOCD: CPT | Performed by: INTERNAL MEDICINE

## 2023-03-16 PROCEDURE — 99214 OFFICE O/P EST MOD 30 MIN: CPT | Performed by: INTERNAL MEDICINE

## 2023-03-16 ASSESSMENT — ENCOUNTER SYMPTOMS
GASTROINTESTINAL NEGATIVE: 1
WEIGHT GAIN: 1
POOR WOUND HEALING: 1
ENDOCRINE NEGATIVE: 1
EYES NEGATIVE: 1
DYSPNEA ON EXERTION: 1
NEUROLOGICAL NEGATIVE: 1
HEMATOLOGIC/LYMPHATIC NEGATIVE: 1
SHORTNESS OF BREATH: 1
SPUTUM PRODUCTION: 1
COUGH: 1
PSYCHIATRIC NEGATIVE: 1

## 2023-03-16 NOTE — LETTER
March 16, 2023     Hyacinth White  [address] [address]    Patient: Rinku Maier  YOB: 1965  Date of Visit: 3/16/2023      Dear Dr. White:    Thank you for referring Rinku Maier to me for evaluation. Below are my notes for this consultation.    If you have questions, please do not hesitate to call me. I look forward to following your patient along with you.         Sincerely,        Alexandre Fernando DO        CC: No Recipients    Alexandre Fernando DO  3/16/2023  2:40 PM  Signed      Chief Complaint: I saw Rinku in the office today on a routine visit and he has a mess.  He is short of breath with exertion he still has severe lymphedema and his legs are weeping that is in light of even getting treatment at the wound care center.  He denies any form of chest pressure chest tightness but has shortness of breath with exertion not anxiety cold weather postprandially at rest or nocturnally.  He states he can climb a flight of stairs without getting short of breath, he denies proximal nocturnal dyspnea orthopnea palpitations syncope he has a severe lower extremity edema and he has nocturia.  He is still smoking cigarettes.  He was recently seen in the emergency room at the request of the wound care people and he had a BNP of 15 had an exacerbation of his COPD with bronchitis and was given steroids and antibiotics.       Medications:  Current Outpatient Medications   Medication Sig Dispense Refill   • albuterol HFA (VENTOLIN HFA) 90 mcg/actuation inhaler inhale ONE TO TWO PUFFS EVERY 6 HOURS AS NEEDED FOR WHEEZING  0   • aspirin 81 mg enteric coated tablet TAKE ONE TABLET BY MOUTH DAILY 90 tablet 3   • DULoxetine (CYMBALTA) 60 mg capsule Take 1 capsule by mouth at bedtime.     • empagliflozin (JARDIANCE) 10 mg tablet Take 1 tablet (10 mg total) by mouth daily. 90 tablet 3   • furosemide (LASIX) 80 mg tablet Take 1 tablet (80 mg total) by mouth daily. 90 tablet 3   •  lisinopriL (PRINIVIL) 40 mg tablet Take 1 tablet (40 mg total) by mouth once daily. 90 tablet 3   • metoprolol succinate XL (TOPROL-XL) 50 mg 24 hr tablet Take 50 mg by mouth daily.     • rosuvastatin (CRESTOR) 20 mg tablet Take 1 tablet (20 mg total) by mouth daily. 90 tablet 3   • spironolactone (ALDACTONE) 25 mg tablet Take 1 tablet (25 mg total) by mouth once daily. 90 tablet 3   • umeclidinium-vilanterol (ANORO ELLIPTA) 62.5-25 mcg/actuation blister with device Inhale 1 puff daily.       No current facility-administered medications for this visit.       Review of Systems:  Review of Systems   Constitutional: Positive for weight gain.   HENT: Negative.    Eyes: Negative.    Cardiovascular: Positive for dyspnea on exertion and leg swelling.   Respiratory: Positive for cough, shortness of breath and sputum production.    Endocrine: Negative.    Hematologic/Lymphatic: Negative.    Skin: Positive for poor wound healing.   Musculoskeletal: Positive for joint pain.   Gastrointestinal: Negative.    Genitourinary: Positive for nocturia.   Neurological: Negative.    Psychiatric/Behavioral: Negative.    Allergic/Immunologic: Positive for environmental allergies.       Physical Exam:  Vitals:    03/16/23 1345   BP: 128/60   Pulse: (!) 103   Resp: 20   SpO2: (!) 90%     Physical Exam  Constitutional:       Appearance: He is well-developed. He is obese.   HENT:      Head: Normocephalic and atraumatic.   Eyes:      Conjunctiva/sclera: Conjunctivae normal.      Pupils: Pupils are equal, round, and reactive to light.   Cardiovascular:      Rate and Rhythm: Normal rate and regular rhythm.      Pulses: Intact distal pulses.      Heart sounds: Normal heart sounds.   Pulmonary:      Effort: Pulmonary effort is normal.      Breath sounds: Normal breath sounds.      Comments:  Decreased BS  Abdominal:      General: Bowel sounds are normal. There is distension.      Palpations: Abdomen is soft.   Musculoskeletal:         General:  Normal range of motion.      Cervical back: Normal range of motion and neck supple.      Right lower leg: Edema present.      Left lower leg: Edema present.   Skin:     General: Skin is warm and dry.      Findings: Erythema present.      Comments: Stasis changes and lymphedema   Neurological:      Mental Status: He is alert and oriented to person, place, and time.      Deep Tendon Reflexes: Reflexes are normal and symmetric.   Psychiatric:         Speech: Speech normal.         Behavior: Behavior normal.         Thought Content: Thought content normal.         Judgment: Judgment normal.       EKG: MAT PRWP V1-5 ST-Tabn no change    Assessment and Plan:  Impression:  Dyspnea multifactorial secondary to COPD morbid obesity deconditioning and possibly CAD.  Hypertension controlled.  Lymphedema lower extremities.  Morbid obesity.  Venous stasis ulcers.  Coronary artery disease status post stent to the RCA.  Sleep apnea.  History of supraventricular tachycardia.  Recommendation:  His EKG is unchanged his rhythm is multifocal atrial tachycardia secondary to his underlying lung disease and obstructing sleep apnea he has a resting O2 sat of 90%.  There is no signs of right-sided heart failure that are new.  I did not make any changes in his medications because a month ago his BNP was 15 and today his lungs are clear and his lymphedema looks a little better.  We will see him in 3 months time, if there is a problem with temperatures or sputum production change in color he is to go to the emergency room he understands.  Thank you for allowing us to see Rinku in the office today.    Alexandre Fernando,

## 2023-03-16 NOTE — PROGRESS NOTES
Chief Complaint: I saw Rinku in the office today on a routine visit and he has a mess.  He is short of breath with exertion he still has severe lymphedema and his legs are weeping that is in light of even getting treatment at the wound care center.  He denies any form of chest pressure chest tightness but has shortness of breath with exertion not anxiety cold weather postprandially at rest or nocturnally.  He states he can climb a flight of stairs without getting short of breath, he denies proximal nocturnal dyspnea orthopnea palpitations syncope he has a severe lower extremity edema and he has nocturia.  He is still smoking cigarettes.  He was recently seen in the emergency room at the request of the wound care people and he had a BNP of 15 had an exacerbation of his COPD with bronchitis and was given steroids and antibiotics.       Medications:  Current Outpatient Medications   Medication Sig Dispense Refill   • albuterol HFA (VENTOLIN HFA) 90 mcg/actuation inhaler inhale ONE TO TWO PUFFS EVERY 6 HOURS AS NEEDED FOR WHEEZING  0   • aspirin 81 mg enteric coated tablet TAKE ONE TABLET BY MOUTH DAILY 90 tablet 3   • DULoxetine (CYMBALTA) 60 mg capsule Take 1 capsule by mouth at bedtime.     • empagliflozin (JARDIANCE) 10 mg tablet Take 1 tablet (10 mg total) by mouth daily. 90 tablet 3   • furosemide (LASIX) 80 mg tablet Take 1 tablet (80 mg total) by mouth daily. 90 tablet 3   • lisinopriL (PRINIVIL) 40 mg tablet Take 1 tablet (40 mg total) by mouth once daily. 90 tablet 3   • metoprolol succinate XL (TOPROL-XL) 50 mg 24 hr tablet Take 50 mg by mouth daily.     • rosuvastatin (CRESTOR) 20 mg tablet Take 1 tablet (20 mg total) by mouth daily. 90 tablet 3   • spironolactone (ALDACTONE) 25 mg tablet Take 1 tablet (25 mg total) by mouth once daily. 90 tablet 3   • umeclidinium-vilanterol (ANORO ELLIPTA) 62.5-25 mcg/actuation blister with device Inhale 1 puff daily.       No current facility-administered medications  for this visit.       Review of Systems:  Review of Systems   Constitutional: Positive for weight gain.   HENT: Negative.    Eyes: Negative.    Cardiovascular: Positive for dyspnea on exertion and leg swelling.   Respiratory: Positive for cough, shortness of breath and sputum production.    Endocrine: Negative.    Hematologic/Lymphatic: Negative.    Skin: Positive for poor wound healing.   Musculoskeletal: Positive for joint pain.   Gastrointestinal: Negative.    Genitourinary: Positive for nocturia.   Neurological: Negative.    Psychiatric/Behavioral: Negative.    Allergic/Immunologic: Positive for environmental allergies.       Physical Exam:  Vitals:    03/16/23 1345   BP: 128/60   Pulse: (!) 103   Resp: 20   SpO2: (!) 90%     Physical Exam  Constitutional:       Appearance: He is well-developed. He is obese.   HENT:      Head: Normocephalic and atraumatic.   Eyes:      Conjunctiva/sclera: Conjunctivae normal.      Pupils: Pupils are equal, round, and reactive to light.   Cardiovascular:      Rate and Rhythm: Normal rate and regular rhythm.      Pulses: Intact distal pulses.      Heart sounds: Normal heart sounds.   Pulmonary:      Effort: Pulmonary effort is normal.      Breath sounds: Normal breath sounds.      Comments:  Decreased BS  Abdominal:      General: Bowel sounds are normal. There is distension.      Palpations: Abdomen is soft.   Musculoskeletal:         General: Normal range of motion.      Cervical back: Normal range of motion and neck supple.      Right lower leg: Edema present.      Left lower leg: Edema present.   Skin:     General: Skin is warm and dry.      Findings: Erythema present.      Comments: Stasis changes and lymphedema   Neurological:      Mental Status: He is alert and oriented to person, place, and time.      Deep Tendon Reflexes: Reflexes are normal and symmetric.   Psychiatric:         Speech: Speech normal.         Behavior: Behavior normal.         Thought Content: Thought  content normal.         Judgment: Judgment normal.       EKG: MAT PRWP V1-5 ST-Tabn no change    Assessment and Plan:  Impression:  Dyspnea multifactorial secondary to COPD morbid obesity deconditioning and possibly CAD.  Hypertension controlled.  Lymphedema lower extremities.  Morbid obesity.  Venous stasis ulcers.  Coronary artery disease status post stent to the RCA.  Sleep apnea.  History of supraventricular tachycardia.  Recommendation:  His EKG is unchanged his rhythm is multifocal atrial tachycardia secondary to his underlying lung disease and obstructing sleep apnea he has a resting O2 sat of 90%.  There is no signs of right-sided heart failure that are new.  I did not make any changes in his medications because a month ago his BNP was 15 and today his lungs are clear and his lymphedema looks a little better.  We will see him in 3 months time, if there is a problem with temperatures or sputum production change in color he is to go to the emergency room he understands.  Thank you for allowing us to see Rinku in the office today.    Alexandre Fernando, DO

## 2023-03-18 ENCOUNTER — TELEPHONE (OUTPATIENT)
Dept: CARDIOLOGY | Facility: CLINIC | Age: 58
End: 2023-03-18
Payer: COMMERCIAL

## 2023-03-18 NOTE — TELEPHONE ENCOUNTER
"The patient contacted the answering service at approximately 7 PM last evening stating that he had \"water coming out of his legs and his face\".  He was very difficult to understand on the phone and sounded dyspneic.  He has been under treatment at the wound center and one of the last notes from Dr. Madrigal states that it was \"just a matter of time until he was in the hospital with heart failure, respiratory failure or both\".    I advised the patient to go to the emergency room for further evaluation-sounds like he needs IV diuresis and treatment of his respiratory disease.  "

## 2024-02-28 NOTE — PROGRESS NOTES
Erum, RN with [facility], called reporting patient's irregular heart rate fluctuating between 108-124 bpm. Patient's had an increase in weakness since discharge from ED yesterday, he's now unable to ambulate with walker, reports incontinence due to inability to travel to bathroom. Per [facility] RN, BP was 130/64, RR 22, SpO2 92% on 2L NC, temperature 99.2 F. Erum confirmed patient is wearing Life Vest. Reports continued SOB, edema. Denies chest discomfort, lightheadedness, dizziness, syncope.    ED visit 2/27/24 for COPD exacerbation, CHF - AF with RVR rate of 106 bpm per EKG, 40 mg IV Lasix given for BLE edema, cleared by PT prior to DC from ED. Recent hospitalization from 2/18-2/26/24 for acute hypoxemic respiratory failure with multiple segmental PE, AF with RVR, acute on chronic combined CHF.     Writer advised patient do to ED for re-evaluation due to increasing weakness and inability to ambulate.     Routing encounter to NP and PCP as an FYI.   Patient ID: Rinku Maier                            : 1965  MRN: [id number]                                            Visit Date: 2023  Encounter Provider: INDERJIT Madrigal  Referring Provider: No ref. provider found    Subjective      HPI  Rinku is a 57 y.o. old male with a chief compliant of Chronic Venous Insufficiency w/ Ulceration   presenting today for evaluation and management of a lower extremity wound.  Patient recently presented to [facility] ED with COPD exacerbation.  He does have a positive COVID PCR as of 2023.  Patient was recommended to be admitted but he left AMA.  He was prescribed prednisone and azithromycin.  The patient was last seen in the Wound Center on 2023 and had bilateral Unna boots applied.      The following have been reviewed and updated as appropriate in this visit:   Tobacco  Allergies  Meds  Problems  Med Hx  Surg Hx  Fam Hx       Past Medical History:  has a past medical history of Abnormal ECG, COPD (chronic obstructive pulmonary disease) (CMS/[facility]), MUELLER (dyspnea on exertion), Hypertension, Peripheral edema, and Sleep apnea.    He has no past medical history of Alcoholism (CMS/[facility]) or Substance abuse (CMS/[facility]).  Past Surgical History:  has a past surgical history that includes Hernia repair.  Social History:   Social History     Tobacco Use   • Smoking status: Every Day     Packs/day: 0.25     Types: Cigarettes     Last attempt to quit: 5/10/2019     Years since quitting: 3.7   • Smokeless tobacco: Never   • Tobacco comments:     former 2 packs a day smoker, currently taking chantix   Substance Use Topics   • Alcohol use: Yes     Comment: occasionally   • Drug use: Defer     Family History: family history includes Heart attack in his biological father; Stomach cancer in his biological mother and biological sister; Stroke in his biological father.  Medications:   Current Outpatient Medications:   •  albuterol HFA (VENTOLIN HFA) 90 mcg/actuation  inhaler, inhale ONE TO TWO PUFFS EVERY 6 HOURS AS NEEDED FOR WHEEZING, Disp: , Rfl: 0  •  aspirin 81 mg enteric coated tablet, TAKE ONE TABLET BY MOUTH DAILY, Disp: 90 tablet, Rfl: 3  •  azithromycin (ZITHROMAX) 250 mg tablet, 1 tablet daily for 4 days., Disp: 4 tablet, Rfl: 0  •  DULoxetine (CYMBALTA) 60 mg capsule, Take 1 capsule by mouth at bedtime., Disp: , Rfl:   •  empagliflozin (JARDIANCE) 10 mg tablet, Take 1 tablet (10 mg total) by mouth daily., Disp: 90 tablet, Rfl: 3  •  furosemide (LASIX) 80 mg tablet, Take 1 tablet (80 mg total) by mouth daily., Disp: 90 tablet, Rfl: 3  •  lisinopriL (PRINIVIL) 40 mg tablet, Take 1 tablet (40 mg total) by mouth once daily., Disp: 90 tablet, Rfl: 3  •  metoprolol succinate XL (TOPROL-XL) 50 mg 24 hr tablet, Take 50 mg by mouth daily., Disp: , Rfl:   •  predniSONE (DELTASONE) 10 mg tablet, 4 tabs x3 days, 3 tabs x3 days, 2 tabs x3 days, 1 tab x3 days, Disp: 30 tablet, Rfl: 0  •  rosuvastatin (CRESTOR) 20 mg tablet, Take 1 tablet (20 mg total) by mouth daily., Disp: 90 tablet, Rfl: 3  •  spironolactone (ALDACTONE) 25 mg tablet, Take 1 tablet (25 mg total) by mouth once daily., Disp: 90 tablet, Rfl: 3  •  umeclidinium-vilanterol (ANORO ELLIPTA) 62.5-25 mcg/actuation blister with device, Inhale 1 puff daily., Disp: , Rfl:     Allergies: has No Known Allergies.     Review of Systems   Constitutional: Positive for fatigue. Negative for appetite change and fever.   HENT: Negative for congestion and rhinorrhea.    Eyes: Negative for discharge and redness.   Respiratory: Negative for cough and shortness of breath.    Cardiovascular: Positive for leg swelling.   Gastrointestinal: Negative for abdominal distention.   Endocrine: Negative for cold intolerance, heat intolerance and polydipsia.   Genitourinary: Negative for flank pain, frequency and hematuria.   Musculoskeletal: Negative for gait problem and joint swelling.   Skin: Positive for wound. Negative for color change.    Allergic/Immunologic: Negative for immunocompromised state.   Neurological: Positive for weakness. Negative for numbness.   Hematological: Does not bruise/bleed easily.   Psychiatric/Behavioral: Negative for confusion. The patient is not nervous/anxious.      Glucose   Date Value Ref Range Status   02/09/2023 126 (H) 70 - 99 mg/dL Final     Hemoglobin   Date Value Ref Range Status   02/09/2023 14.1 13.7 - 17.5 g/dL Final     Creatinine   Date Value Ref Range Status   02/09/2023 1.3 0.8 - 1.3 mg/dL Final   ]    Objective   Visit Vitals  Temp 36.2 °C (97.2 °F)   Resp 20       Physical Exam  Vitals and nursing note reviewed.   Constitutional:       General: He is not in acute distress.     Appearance: Normal appearance.   HENT:      Head: Normocephalic and atraumatic.   Eyes:      Conjunctiva/sclera: Conjunctivae normal.      Pupils: Pupils are equal, round, and reactive to light.   Neck:      Trachea: Trachea normal.   Cardiovascular:      Rate and Rhythm: Regular rhythm.      Pulses: Intact distal pulses.   Pulmonary:      Effort: Pulmonary effort is normal. No respiratory distress.      Breath sounds: No wheezing.   Abdominal:      Palpations: Abdomen is soft.      Tenderness: There is no guarding.   Musculoskeletal:         General: Swelling present. No deformity. Normal range of motion.      Cervical back: Normal range of motion. No edema or erythema.      Right lower leg: Edema present.      Left lower leg: Edema present.        Legs:    Skin:     General: Skin is warm.      Capillary Refill: Capillary refill takes less than 2 seconds.   Neurological:      Mental Status: He is alert and oriented to person, place, and time. Mental status is at baseline.   Psychiatric:         Attention and Perception: He is attentive.         Mood and Affect: Affect is not inappropriate.         Speech: Speech normal.         Behavior: Behavior normal. Behavior is cooperative.         Cognition and Memory: Cognition and memory  normal.           Left lateral calf                                                                   left posterior calf                                                                                              During visit patient received cleansing with gauze and saline to all wounds to assist in removing bioburden and loosely adhered slough    Assessment/Plan    Diagnosis Plan   1. Venous ulcer of left leg (CMS/HCC)        2. Xerosis of skin        3. Atrophic condition of skin        4. Chronic peripheral venous hypertension        5. Edema of both lower extremities        6. Tobacco abuse        7. Venous ulcer of right leg (CMS/HCC)          Problem List Items Addressed This Visit     Tobacco abuse     The patient is made aware of the deleterious effects of smoking and nicotine on wound healing.  He has been provided resources within the [facility] health system to assist him with smoking cessation.         Edema of both lower extremities     Rx: Knee-high gradient compression garments, 15/20 mmHg… Referred to [facility]'s pharmacy for fitting and acquisition    PLAN:  Maintain leg elevation above level of the heart as much as possible.  Restrict fluid intake to < 66oz/24 hours  Restrict salt intake to no more than 5877-9397 mgs/sodium/day  Minimize prolonged sitting and standing  Remain active to point of physical tolerance         Chronic peripheral venous hypertension     PLAN:  Bilateral Unna Boots applied by RN     Patient is given specific instructions regarding care of her Unna Boot.  It is essential that the patient keep legs elevated as much as possible, but short distance ambulation is encouraged and does increase efficacy of Unna Boot compression therapy.  The Unna Boot must be kept dry         Atrophic condition of skin     PLAN:  Eucerin Original Healing Cream to dry skin of legs daily.     It is recommended that you clean the wound using running warm water, a mild bath soap, and a soft wash cloth  or bath sponge. Proper cleansing of your wound is essential for wound healing to take place. Inadequate cleansing leads to the build up of yellow or green material in the wound bed, providing overgrowth of bacteria and noxious bacterial by products.         Xerosis of skin     PLAN:  I have recommended that ammonium lactate 12% lotion (Brand names: Lac-Hydrin, Am-Lactin, Moira-Lac) be applied to the dry skin of the legs and feet 1-2x daily.  These products work best when the patient is capable of washing the extremity using running warm water and a mild soap.  Use of a soft wash cloth or sponge is recommended to facilitate exfoliation of the dry skin.         Venous ulcer of right leg (CMS/HCC)     No need for Unna boot application at this point in time.    Will treat patient with conservative measures in minimizing excessive edema of the lower extremities.  These measures include, and forced leg elevation, physical activity to the point of tolerance, routine hygiene and skin care, and use of some form of compression garment, such as a Tubigrip or prescribed knee-high compression stockings.    Patient will alert the wound center if she notes increase edema, or any recurrence of ulceration or point of drainage.         Venous ulcer of left leg (CMS/HCC) - Primary     PLAN:  Unna Boot/Aquacel Ag applied LLE by RN     Patient is given specific instructions regarding care of her Unna Boot.  It is essential that the patient keep legs elevated as much as possible, but short distance ambulation is encouraged and does increase efficacy of Unna Boot compression therapy.  The Unna Boot must be kept dry.        This document was generated using speech recognition software. Please excuse any typographical errors.    Return to wound center in 1 week     INDERJIT Bueno. MD Rohan

## (undated) DEVICE — GLIDESHEATH SLENDER SS (.021) 6FR 10CM

## (undated) DEVICE — INFLATION DEVICE ENCORE 26

## (undated) DEVICE — CATH GUIDE ADROIT 6FR .072 JR4 100CM

## (undated) DEVICE — CATHETER 6FR SWAN

## (undated) DEVICE — CATH BALL EMERGE MONO 12/3.0MM

## (undated) DEVICE — GUIDEWIRE BMW UNIVERSAL 190CM "J"

## (undated) DEVICE — CATH D 6F FR4 100CM

## (undated) DEVICE — TR BAND LONG

## (undated) DEVICE — GUIDEWIRE DIAGNOSTIC 035-260 EXCHANGE

## (undated) DEVICE — CATH 6FR FL3.5